# Patient Record
Sex: MALE | Race: WHITE
[De-identification: names, ages, dates, MRNs, and addresses within clinical notes are randomized per-mention and may not be internally consistent; named-entity substitution may affect disease eponyms.]

---

## 2018-07-03 ENCOUNTER — HOSPITAL ENCOUNTER (INPATIENT)
Dept: HOSPITAL 56 - MW.ED | Age: 58
LOS: 6 days | Discharge: HOME | DRG: 383 | End: 2018-07-09
Attending: INTERNAL MEDICINE | Admitting: INTERNAL MEDICINE
Payer: COMMERCIAL

## 2018-07-03 DIAGNOSIS — E11.9: ICD-10-CM

## 2018-07-03 DIAGNOSIS — Z79.899: ICD-10-CM

## 2018-07-03 DIAGNOSIS — I10: ICD-10-CM

## 2018-07-03 DIAGNOSIS — R74.0: ICD-10-CM

## 2018-07-03 DIAGNOSIS — Z79.82: ICD-10-CM

## 2018-07-03 DIAGNOSIS — L03.115: Primary | ICD-10-CM

## 2018-07-03 DIAGNOSIS — Z88.8: ICD-10-CM

## 2018-07-03 DIAGNOSIS — E66.01: ICD-10-CM

## 2018-07-03 DIAGNOSIS — K21.9: ICD-10-CM

## 2018-07-03 LAB
CHLORIDE SERPL-SCNC: 103 MMOL/L (ref 98–107)
SODIUM SERPL-SCNC: 135 MMOL/L (ref 136–148)

## 2018-07-03 RX ADMIN — DEXTROSE SCH UNITS: 10 SOLUTION INTRAVENOUS at 15:19

## 2018-07-03 NOTE — CR
EXAMINATION: Two-view chest (PA and Lateral views).

 

HISTORY: Lower extremity swelling.

 

FINDINGS: 

The trachea is midline. The cardiomediastinal silhouette is within normal limits. No pulmonary infilt
rates, effusions or pneumothorax.

 

Osseous structures appear unremarkable.

 

IMPRESSION: 

No acute cardiopulmonary process.

## 2018-07-03 NOTE — US
ULTRASOUND EXAMINATION OF  the right lower extremity WITH DOPPLER

 

HISTORY:  Swelling

 

FINDINGS: 

Examination of the right leg was performed from the groin to the calf region.  All visualized segment
s including common femoral, proximal greater saphenous, superficial femoral, popliteal and calf veins
 appear patent with good flow and augmentation.  There is no evidence of deep vein thrombosis.

 

IMPRESSION: 

No evidence of a DVT.

## 2018-07-03 NOTE — EDM.PDOC
ED HPI GENERAL MEDICAL PROBLEM





- General


Chief Complaint: Lower Extremity Injury/Pain


Stated Complaint: RIGHT LEG SWELLING


Time Seen by Provider: 07/03/18 10:57


Source of Information: Reports: Patient





- History of Present Illness


INITIAL COMMENTS - FREE TEXT/NARRATIVE: 





HISTORY AND PHYSICAL:





History of present illness:


[]Khanh is a 57-year-old male here for right lower extremity swelling. He 

reports that he was working last week, took his boot off and leg was swollen. 

The leg has progressively gotten more swelling and red in the last week. He 

states it is painful to walk on. He denies any injury or open wounds to the 

leg. He works as a , 3 weeks ago drove from [place] to here. 

Patient also complains of a cough but no SOB, chest pain/pressure, hemoptysis. 

He denies fevers but reports he has been waking up in cold sweats. 


Past medical history of diabetes, hypertension, hyperlipidemia, GERD.  Patient 

recently started on trulicity which he has been out of the past 2 weeks. A1c 2 

months ago was 7.7%. 





Review of systems: 


As per history of present illness and below otherwise all systems reviewed and 

negative.





Past medical history: 


As per history of present illness and as reviewed below otherwise 

noncontributory.





Surgical history: 


As per history of present illness and as reviewed below otherwise 

noncontributory.





Social history: 


No reported history of drug or alcohol abuse.





Family history: 


As per history of present illness and as reviewed below otherwise 

noncontributory.





Physical exam:


HEENT: Atraumatic, normocephalic, pupils reactive, negative for conjunctival 

pallor or scleral icterus, mucous membranes moist, throat clear, neck supple, 

nontender, trachea midline.


Lungs: Dry cough noted. Clear to auscultation, breath sounds equal bilaterally, 

chest nontender.


Heart: S1S2, regular, negative for clicks, rubs.


Pelvis: Stable nontender.


Genitourinary: Deferred.


Rectal: Deferred.


Extremities: right lower extremity is moderately swollen and erythematous from 

the ankle to just inferior to the knee and up the medial thigh. 4+ pitting 

edema right, 2+ pitting edema left.   Neurovascular unremarkable.


Neuro: Awake, alert, oriented. Cranial nerves II through XII unremarkable. 

Cerebellum unremarkable. Motor and sensory unremarkable throughout. Exam 

nonfocal.





Notes: Discussed with Dr. Rodas, patient accepted as inpatient. 





Diagnostics:


[CBC, PT/INR, CMP, UA


Venous doppler US, right


CXR]





Therapeutics:


[IV vancomycin 1 gram]





Impression: 


[Cellulitis, right lower extremity]





Plan:


[Patient admitted for IV antibiotics ]





Definitive disposition and diagnosis as appropriate pending reevaluation and 

review of above.





Onset: Gradual


Duration: Week(s): (1)


Location: Reports: Lower Extremity, Right


  ** Right leg


Pain Score (Numeric/FACES): 10





- Related Data


 Allergies











Allergy/AdvReac Type Severity Reaction Status Date / Time


 


prednisone Allergy  Nose Bleeds Verified 07/03/18 10:57











Home Meds: 


 Home Meds





Aspirin 325 mg PO BID 07/03/18 [History]


Glimepiride [Amaryl] 4 mg PO DAILY 07/03/18 [History]


Lisinopril 40 mg PO DAILY 07/03/18 [History]


Naproxen Sodium [Aleve] 220 mg PO ASDIRECTED PRN 07/03/18 [History]


Omeprazole 20 mg PO DAILY 07/03/18 [History]


Pravastatin [Pravachol] 40 mg PO DAILY 07/03/18 [History]


amLODIPine/Valsartan [Amlodipine-Valsartan  mg] 1 each PO DAILY 07/03/18 [

History]


metFORMIN HCl [Metformin HCl] 1,000 mg PO BID 07/03/18 [History]











Past Medical History


Cardiovascular History: Reports: Hypertension


Gastrointestinal History: Reports: GERD, Hiatal Hernia


Endocrine/Metabolic History: Reports: Diabetes, Type II, Obesity/BMI 30+





Social & Family History





- Tobacco Use


Smoking Status *Q: Never Smoker


Second Hand Smoke Exposure: Yes





- Caffeine Use


Caffeine Use: Reports: Coffee





- Recreational Drug Use


Recreational Drug Use: No





Review of Systems





- Review of Systems


Review Of Systems: ROS reveals no pertinent complaints other than HPI.





ED EXAM, GENERAL





- Physical Exam


Exam: See Below (see dictation)





Course





- Vital Signs


Last Recorded V/S: 


 Last Vital Signs











Temp  36.4 C   07/03/18 11:00


 


Pulse  109 H  07/03/18 11:00


 


Resp  16   07/03/18 11:00


 


BP  130/75   07/03/18 11:00


 


Pulse Ox  97   07/03/18 11:00














- Orders/Labs/Meds


Orders: 


 Active Orders 24 hr











 Category Date Time Status


 


 Admission Status [Patient Status] [ADT] Routine ADT  07/03/18 12:58 Active


 


 CULTURE BLOOD [BC] Stat Lab  07/03/18 12:38 Ordered


 


 CULTURE BLOOD [BC] Stat Lab  07/03/18 12:38 Ordered


 


 UA W/MICROSCOPIC [URIN] Stat Lab  07/03/18 12:27 Received


 


 Sodium Chloride 0.9% [Saline Flush] Med  07/03/18 11:25 Active





 10 ml FLUSH ASDIRECTED PRN   


 


 Sodium Chloride 0.9% [Saline Flush] Med  07/03/18 11:25 Active





 2.5 ml FLUSH ASDIRECTED PRN   


 


 Vancomycin [Vancocin] 1 gm Med  07/03/18 12:43 Active





 Sodium Chloride 0.9% [Normal Saline] 250 ml   





 IV ONETIME   


 


 Blood Culture x2 Reflex Set [OM.PC] Stat Oth  07/03/18 12:38 Ordered


 


 Saline Lock Insert [OM.PC] Stat Oth  07/03/18 11:25 Ordered








 Medication Orders





Vancomycin HCl 1 gm/ Sodium (Chloride)  250 mls @ 250 mls/hr IV ONETIME ONE


   Stop: 07/03/18 13:42


   Last Admin: 07/03/18 12:53 Dose:  250 mls/hr


Sodium Chloride (Saline Flush)  10 ml FLUSH ASDIRECTED PRN


   PRN Reason: Keep Vein Open


Sodium Chloride (Saline Flush)  2.5 ml FLUSH ASDIRECTED PRN


   PRN Reason: Keep Vein Open








Labs: 


 Laboratory Tests











  07/03/18 07/03/18 07/03/18 Range/Units





  11:35 11:35 11:35 


 


WBC  13.99 H    (4.0-11.0)  K/uL


 


RBC  4.56    (4.50-5.90)  M/uL


 


Hgb  13.6    (13.0-17.0)  g/dL


 


Hct  38.4    (38.0-50.0)  %


 


MCV  84.2    (80.0-98.0)  fL


 


MCH  29.8    (27.0-32.0)  pg


 


MCHC  35.4    (31.0-37.0)  g/dL


 


RDW Std Deviation  45.4    (28.0-62.0)  fl


 


RDW Coeff of Bozena  15    (11.0-15.0)  %


 


Plt Count  148 L    (150-400)  K/uL


 


MPV  12.60 H    (7.40-12.00)  fL


 


Add Manual Diff  YES    


 


Neutrophils % (Manual)  83 H    (48.0-80.0)  %


 


Band Neutrophils %  5    %


 


Lymphocytes % (Manual)  6 L    (16.0-40.0)  %


 


Monocytes % (Manual)  5    (0.0-15.0)  %


 


Eosinophils % (Manual)  1    (0.0-7.0)  %


 


Nucleated RBC %  0.0    /100WBC


 


Absolute Seg Neuts  11.6 H    (1.4-5.7)  


 


Band Neutrophils #  0.7    


 


Lymphocytes # (Manual)  0.8    (0.6-2.4)  


 


Monocytes # (Manual)  0.7    (0.0-0.8)  


 


Eosinophils # (Manual)  0.1    (0.0-0.7)  


 


Nucleated RBCs #  0    K/uL


 


INR   1.09   


 


Sodium    135 L  (136-148)  mmol/L


 


Potassium    3.5  (3.5-5.1)  mmol/L


 


Chloride    103  ()  mmol/L


 


Carbon Dioxide    25.4  (21.0-32.0)  mmol/L


 


BUN    20 H  (7.0-18.0)  mg/dL


 


Creatinine    1.3  (0.8-1.3)  mg/dL


 


Est Cr Clr Drug Dosing    60.03  mL/min


 


Estimated GFR (MDRD)    57.3  ml/min


 


Glucose    406 H  ()  mg/dL


 


Calcium    8.7  (8.5-10.1)  mg/dL


 


Total Bilirubin    1.4 H  (0.2-1.0)  mg/dL


 


AST    61 H  (15-37)  IU/L


 


ALT    97 H  (14-63)  IU/L


 


Alkaline Phosphatase    159 H  ()  U/L


 


Total Protein    6.4  (6.4-8.2)  g/dL


 


Albumin    2.2 L  (3.4-5.0)  g/dL


 


Globulin    4.2 H  (2.0-3.5)  g/dL


 


Albumin/Globulin Ratio    0.5 L  (1.3-2.8)  


 


Urine Color     


 


Urine Appearance     


 


Urine pH     (5.0-8.0)  


 


Ur Specific Gravity     (1.001-1.035)  


 


Urine Protein     (NEGATIVE)  mg/dL


 


Urine Glucose (UA)     (NEGATIVE)  mg/dL


 


Urine Ketones     (NEGATIVE)  mg/dL


 


Urine Occult Blood     (NEGATIVE)  


 


Urine Nitrite     (NEGATIVE)  


 


Urine Bilirubin     (NEGATIVE)  


 


Urine Ictotest     


 


Urine Urobilinogen     (<2.0)  EU/dL


 


Ur Leukocyte Esterase     (NEGATIVE)  


 


Urine RBC     (0-2/HPF)  


 


Urine WBC     (0-5/HPF)  


 


Ur Epithelial Cells     (NONE-FEW)  


 


Urine Bacteria     (NEGATIVE)  














  07/03/18 Range/Units





  12:27 


 


WBC   (4.0-11.0)  K/uL


 


RBC   (4.50-5.90)  M/uL


 


Hgb   (13.0-17.0)  g/dL


 


Hct   (38.0-50.0)  %


 


MCV   (80.0-98.0)  fL


 


MCH   (27.0-32.0)  pg


 


MCHC   (31.0-37.0)  g/dL


 


RDW Std Deviation   (28.0-62.0)  fl


 


RDW Coeff of Bozena   (11.0-15.0)  %


 


Plt Count   (150-400)  K/uL


 


MPV   (7.40-12.00)  fL


 


Add Manual Diff   


 


Neutrophils % (Manual)   (48.0-80.0)  %


 


Band Neutrophils %   %


 


Lymphocytes % (Manual)   (16.0-40.0)  %


 


Monocytes % (Manual)   (0.0-15.0)  %


 


Eosinophils % (Manual)   (0.0-7.0)  %


 


Nucleated RBC %   /100WBC


 


Absolute Seg Neuts   (1.4-5.7)  


 


Band Neutrophils #   


 


Lymphocytes # (Manual)   (0.6-2.4)  


 


Monocytes # (Manual)   (0.0-0.8)  


 


Eosinophils # (Manual)   (0.0-0.7)  


 


Nucleated RBCs #   K/uL


 


INR   


 


Sodium   (136-148)  mmol/L


 


Potassium   (3.5-5.1)  mmol/L


 


Chloride   ()  mmol/L


 


Carbon Dioxide   (21.0-32.0)  mmol/L


 


BUN   (7.0-18.0)  mg/dL


 


Creatinine   (0.8-1.3)  mg/dL


 


Est Cr Clr Drug Dosing   mL/min


 


Estimated GFR (MDRD)   ml/min


 


Glucose   ()  mg/dL


 


Calcium   (8.5-10.1)  mg/dL


 


Total Bilirubin   (0.2-1.0)  mg/dL


 


AST   (15-37)  IU/L


 


ALT   (14-63)  IU/L


 


Alkaline Phosphatase   ()  U/L


 


Total Protein   (6.4-8.2)  g/dL


 


Albumin   (3.4-5.0)  g/dL


 


Globulin   (2.0-3.5)  g/dL


 


Albumin/Globulin Ratio   (1.3-2.8)  


 


Urine Color  YELLOW  


 


Urine Appearance  CLEAR  


 


Urine pH  6.0  (5.0-8.0)  


 


Ur Specific Gravity  <= 1.005  (1.001-1.035)  


 


Urine Protein  30  (NEGATIVE)  mg/dL


 


Urine Glucose (UA)  >=1000  (NEGATIVE)  mg/dL


 


Urine Ketones  TRACE H  (NEGATIVE)  mg/dL


 


Urine Occult Blood  TRACE-LYSED  (NEGATIVE)  


 


Urine Nitrite  NEGATIVE  (NEGATIVE)  


 


Urine Bilirubin  SMALL H  (NEGATIVE)  


 


Urine Ictotest  NEGATIVE  


 


Urine Urobilinogen  >=8.0 H  (<2.0)  EU/dL


 


Ur Leukocyte Esterase  NEGATIVE  (NEGATIVE)  


 


Urine RBC  0-1  (0-2/HPF)  


 


Urine WBC  0-2  (0-5/HPF)  


 


Ur Epithelial Cells  RARE  (NONE-FEW)  


 


Urine Bacteria  RARE  (NEGATIVE)  











Meds: 


Medications











Generic Name Dose Route Start Last Admin





  Trade Name Freq  PRN Reason Stop Dose Admin


 


Vancomycin HCl 1 gm/ Sodium  250 mls @ 250 mls/hr  07/03/18 12:43  07/03/18 12:

53





  Chloride  IV  07/03/18 13:42  250 mls/hr





  ONETIME ONE   Administration





     





     





     





     


 


Sodium Chloride  10 ml  07/03/18 11:25  





  Saline Flush  FLUSH   





  ASDIRECTED PRN   





  Keep Vein Open   





     





     





     


 


Sodium Chloride  2.5 ml  07/03/18 11:25  





  Saline Flush  FLUSH   





  ASDIRECTED PRN   





  Keep Vein Open   





     





     





     














Departure





- Departure


Time of Disposition: 13:09


Disposition: Admitted As Inpatient 68


Condition: Good


Clinical Impression: 


Cellulitis


Qualifiers:


 Site of cellulitis: extremity Site of cellulitis of extremity: lower extremity 

Laterality: right Qualified Code(s): L03.115 - Cellulitis of right lower limb








- Discharge Information


Referrals: 


PCP,None [Primary Care Provider] - 


Forms:  ED Department Discharge





- My Orders


Last 24 Hours: 


My Active Orders





07/03/18 11:25


Sodium Chloride 0.9% [Saline Flush]   10 ml FLUSH ASDIRECTED PRN 


Sodium Chloride 0.9% [Saline Flush]   2.5 ml FLUSH ASDIRECTED PRN 


Saline Lock Insert [OM.PC] Stat 





07/03/18 12:27


UA W/MICROSCOPIC [URIN] Stat 





07/03/18 12:38


CULTURE BLOOD [BC] Stat 


CULTURE BLOOD [BC] Stat 


Blood Culture x2 Reflex Set [OM.PC] Stat 





07/03/18 12:43


Vancomycin [Vancocin] 1 gm   Sodium Chloride 0.9% [Normal Saline] 250 ml IV 

ONETIME 





07/03/18 12:58


Admission Status [Patient Status] [ADT] Routine 














- Assessment/Plan


Last 24 Hours: 


My Active Orders





07/03/18 11:25


Sodium Chloride 0.9% [Saline Flush]   10 ml FLUSH ASDIRECTED PRN 


Sodium Chloride 0.9% [Saline Flush]   2.5 ml FLUSH ASDIRECTED PRN 


Saline Lock Insert [OM.PC] Stat 





07/03/18 12:27


UA W/MICROSCOPIC [URIN] Stat 





07/03/18 12:38


CULTURE BLOOD [BC] Stat 


CULTURE BLOOD [BC] Stat 


Blood Culture x2 Reflex Set [OM.PC] Stat 





07/03/18 12:43


Vancomycin [Vancocin] 1 gm   Sodium Chloride 0.9% [Normal Saline] 250 ml IV 

ONETIME 





07/03/18 12:58


Admission Status [Patient Status] [ADT] Routine

## 2018-07-03 NOTE — PCM.HP
H&P History of Present Illness





- General


Date of Service: 07/03/18


Admit Problem/Dx: 


 Admission Diagnosis/Problem





Admission Diagnosis/Problem      Cellulitis








Source of Information: Patient


History Limitations: Reports: No Limitations





- History of Present Illness


Initial Comments - Free Text/Narative: 





This 57 year old male with pmh of DM type 2, HTN, and obesity presented to the 

ED today with complaints of a red, warm swollen, tender R lower leg that has 

been oging on for nearly 1 week. He reports last week he drove with his 

daughter and grandson from [place]. He reports working in the heat and not 

drinking many fluids last week Thursday and at the end of the day he noticed 

his R leg was swollen and red. He kept it elevated and put ice packs on it and 

he was able to get his boot on the next day. He continued working over the 

weekend and did this routine. Today He woke up and noticed the leg was quite a 

bit more swollen and the redness spread up to his upper inner thigh and he 

noticeds his foot was more swollen than it had been. He reports having 

subjective fevers and chills at home with general malaise and fatigue. No chest 

pain or SOB. He reports a dry hacking cough with some feelings of post nasal 

drip that has been going on for about 1 week. No sinus congestion sore throat 

or ear pain. No Neck pain, abodminal pain or urinary symptoms. he reports 

feeling very thirsty and voiding frequently but the amount seems off for how 

much he is drinking, he reports not urinating much. No dysuria. No diarrhea or 

constipation and no black or bloody BMs. He has good follow up at home. Recent 

A1c was 7.7. He was started on Trulicity recently but has not taken it for 2 

weeks because he was out here working and he didn't bring it because it has to 

be refrigerated. He denies alcohol use, recreational drug use or tobacco abuse. 

He denies known history of CHF, no CAD or MI. No COPD.





In the ED leukocytosis noted at 13,990, Na 135, BUN 20, Cr 1.3. Glucose 406. UA 

negative. Bili 1.6 AST 61, ALT 97, Alk phos 159. He initially was noted to be 

slightly tachycardic, BP WNL and no hypoxia. Venous doppler obtained of R lower 

leg, which was negative for DVT and CXR negative. He was treated with 

Vancomycin and admitted inpatient for R lower leg cellulitis. 


  ** Right leg


Pain Score (Numeric/FACES): 10





- Related Data


Allergies/Adverse Reactions: 


 Allergies











Allergy/AdvReac Type Severity Reaction Status Date / Time


 


prednisone Allergy  Nose Bleeds Verified 07/03/18 10:57











Home Medications: 


 Home Meds





Aspirin 325 mg PO BID 07/03/18 [History]


Glimepiride [Amaryl] 4 mg PO DAILY 07/03/18 [History]


Lisinopril 40 mg PO DAILY 07/03/18 [History]


Naproxen Sodium [Aleve] 220 mg PO ASDIRECTED PRN 07/03/18 [History]


Omeprazole 20 mg PO DAILY 07/03/18 [History]


Pravastatin [Pravachol] 40 mg PO DAILY 07/03/18 [History]


amLODIPine/Valsartan [Amlodipine-Valsartan  mg] 1 each PO DAILY 07/03/18 [

History]


metFORMIN HCl [Metformin HCl] 1,000 mg PO BID 07/03/18 [History]











Past Medical History


Cardiovascular History: Reports: Hypertension.  Denies: Afib, Blood Clots/VTE/

DVT, CAD, Heart Failure, MI


Respiratory History: Reports: None.  Denies: Asthma, COPD, PE


Gastrointestinal History: Reports: GERD, Hiatal Hernia


Genitourinary History: Reports: None.  Denies: Chronic Renal Insuffiency


Musculoskeletal History: Reports: None


Neurological History: Reports: None.  Denies: CVA, TIA


Endocrine/Metabolic History: Reports: Diabetes, Type II, Obesity/BMI 30+.  

Denies: Hypothyroidism





- Past Surgical History


Cardiovascular Surgical History: Reports: None


Respiratory Surgical History: Reports: None


GI Surgical History: Reports: None


Endocrine Surgical History: Reports: None





Social & Family History





- Tobacco Use


Smoking Status *Q: Never Smoker


Second Hand Smoke Exposure: Yes





- Caffeine Use


Caffeine Use: Reports: Coffee





- Alcohol Use


Alcohol Use Frequency: Rarely





- Recreational Drug Use


Recreational Drug Use: No





- Living Situation & Occupation


Living situation: Reports: 


Occupation: Employed





H&P Review of Systems





- Review of Systems:


Review Of Systems: See Below


General: Reports: Fever, Chills, Malaise, Fatigue


HEENT: Reports: Post Nasal Drip.  Denies: Ear Pain, Headaches, Sinus Congestion

, Sore Throat, Vertigo


Pulmonary: Reports: Cough (dry hacking).  Denies: Shortness of Breath, Wheezing

, Pleuritic Chest Pain


Cardiovascular: Reports: Edema (R lower leg).  Denies: Chest Pain, Palpitations

, Lightheadedness, Syncope


Gastrointestinal: Reports: No Symptoms.  Denies: Abdominal Pain, Black Stool, 

Bloody Stool, Decreased Appetite, Distension, Nausea, Vomiting


Genitourinary: Reports: No Symptoms.  Denies: Dysuria, Frequency, Burning, Pain


Musculoskeletal: Reports: Leg Pain (R lower leg)


Skin: Reports: Erythema.  Denies: Wound


Neurological: Reports: No Symptoms


Hematologic/Lymphatic: Reports: No Symptoms


Immunologic: Reports: No Symptoms





Exam





- Exam


Exam: See Below





- Vital Signs


Vital Signs: 


 Last Vital Signs











Temp  97.0 F   07/03/18 13:33


 


Pulse  98   07/03/18 13:33


 


Resp  16   07/03/18 13:33


 


BP  131/74   07/03/18 13:33


 


Pulse Ox  97   07/03/18 13:33











Weight: 153.6 kg





- Exam


Quality Assessment: DVT Prophylaxis


General: Alert, Oriented, Cooperative


HEENT: Conjunctiva Clear, Mucosa Moist & Pink, Posterior Pharynx Clear, Pupils 

Reactive


Neck: Supple, Trachea Midline


Lungs: Clear to Auscultation, Normal Respiratory Effort


Cardiovascular: Regular Rate, Regular Rhythm, Normal S1, Normal S2.  No: 

Systolic Murmur


GI/Abdominal Exam: Normal Bowel Sounds, Soft, Non-Tender


 (Male) Exam: Normal Inspection.  No: Scrotal Swelling


Back Exam: Normal Inspection


Extremities: Normal Inspection, Normal Range of Motion, Pedal Edema (+2 pitting 

edema to R lower leg, +1 pitting to L-patient reports this is normal for L. But 

R is more swollen.)


Skin: Other (Erythema to entire R lower leg with small scattered blisters to 

anterior shin. No open areas draining. Leg very taught, warm and edematous. No 

erythema to foot or toes and does not include knee, but extends medially upper 

inner thigh with some tenderness to R groin and lymphadenopathy noted in groin. 

Some chronic venous skin changes noted bilaterally to lower legs.)


Neuro Extensive - Mental Status: Alert, Oriented x3


Neuro Extensive - Motor, Sensory, Reflexes: CN II-XII Intact


Psychiatric: Alert, Normal Affect, Normal Mood





- Patient Data


Lab Results Last 24 hrs: 


 Laboratory Results - last 24 hr











  07/03/18 07/03/18 07/03/18 Range/Units





  11:35 11:35 11:35 


 


WBC  13.99 H    (4.0-11.0)  K/uL


 


RBC  4.56    (4.50-5.90)  M/uL


 


Hgb  13.6    (13.0-17.0)  g/dL


 


Hct  38.4    (38.0-50.0)  %


 


MCV  84.2    (80.0-98.0)  fL


 


MCH  29.8    (27.0-32.0)  pg


 


MCHC  35.4    (31.0-37.0)  g/dL


 


RDW Std Deviation  45.4    (28.0-62.0)  fl


 


RDW Coeff of Bozena  15    (11.0-15.0)  %


 


Plt Count  148 L    (150-400)  K/uL


 


MPV  12.60 H    (7.40-12.00)  fL


 


Add Manual Diff  YES    


 


Neutrophils % (Manual)  83 H    (48.0-80.0)  %


 


Band Neutrophils %  5    %


 


Lymphocytes % (Manual)  6 L    (16.0-40.0)  %


 


Monocytes % (Manual)  5    (0.0-15.0)  %


 


Eosinophils % (Manual)  1    (0.0-7.0)  %


 


Nucleated RBC %  0.0    /100WBC


 


Absolute Seg Neuts  11.6 H    (1.4-5.7)  


 


Band Neutrophils #  0.7    


 


Lymphocytes # (Manual)  0.8    (0.6-2.4)  


 


Monocytes # (Manual)  0.7    (0.0-0.8)  


 


Eosinophils # (Manual)  0.1    (0.0-0.7)  


 


Nucleated RBCs #  0    K/uL


 


INR   1.09   


 


Sodium    135 L  (136-148)  mmol/L


 


Potassium    3.5  (3.5-5.1)  mmol/L


 


Chloride    103  ()  mmol/L


 


Carbon Dioxide    25.4  (21.0-32.0)  mmol/L


 


BUN    20 H  (7.0-18.0)  mg/dL


 


Creatinine    1.3  (0.8-1.3)  mg/dL


 


Est Cr Clr Drug Dosing    60.03  mL/min


 


Estimated GFR (MDRD)    57.3  ml/min


 


Glucose    406 H  ()  mg/dL


 


Calcium    8.7  (8.5-10.1)  mg/dL


 


Total Bilirubin    1.4 H  (0.2-1.0)  mg/dL


 


AST    61 H  (15-37)  IU/L


 


ALT    97 H  (14-63)  IU/L


 


Alkaline Phosphatase    159 H  ()  U/L


 


Total Protein    6.4  (6.4-8.2)  g/dL


 


Albumin    2.2 L  (3.4-5.0)  g/dL


 


Globulin    4.2 H  (2.0-3.5)  g/dL


 


Albumin/Globulin Ratio    0.5 L  (1.3-2.8)  


 


Urine Color     


 


Urine Appearance     


 


Urine pH     (5.0-8.0)  


 


Ur Specific Gravity     (1.001-1.035)  


 


Urine Protein     (NEGATIVE)  mg/dL


 


Urine Glucose (UA)     (NEGATIVE)  mg/dL


 


Urine Ketones     (NEGATIVE)  mg/dL


 


Urine Occult Blood     (NEGATIVE)  


 


Urine Nitrite     (NEGATIVE)  


 


Urine Bilirubin     (NEGATIVE)  


 


Urine Ictotest     


 


Urine Urobilinogen     (<2.0)  EU/dL


 


Ur Leukocyte Esterase     (NEGATIVE)  


 


Urine RBC     (0-2/HPF)  


 


Urine WBC     (0-5/HPF)  


 


Ur Epithelial Cells     (NONE-FEW)  


 


Urine Bacteria     (NEGATIVE)  














  07/03/18 Range/Units





  12:27 


 


WBC   (4.0-11.0)  K/uL


 


RBC   (4.50-5.90)  M/uL


 


Hgb   (13.0-17.0)  g/dL


 


Hct   (38.0-50.0)  %


 


MCV   (80.0-98.0)  fL


 


MCH   (27.0-32.0)  pg


 


MCHC   (31.0-37.0)  g/dL


 


RDW Std Deviation   (28.0-62.0)  fl


 


RDW Coeff of Bozena   (11.0-15.0)  %


 


Plt Count   (150-400)  K/uL


 


MPV   (7.40-12.00)  fL


 


Add Manual Diff   


 


Neutrophils % (Manual)   (48.0-80.0)  %


 


Band Neutrophils %   %


 


Lymphocytes % (Manual)   (16.0-40.0)  %


 


Monocytes % (Manual)   (0.0-15.0)  %


 


Eosinophils % (Manual)   (0.0-7.0)  %


 


Nucleated RBC %   /100WBC


 


Absolute Seg Neuts   (1.4-5.7)  


 


Band Neutrophils #   


 


Lymphocytes # (Manual)   (0.6-2.4)  


 


Monocytes # (Manual)   (0.0-0.8)  


 


Eosinophils # (Manual)   (0.0-0.7)  


 


Nucleated RBCs #   K/uL


 


INR   


 


Sodium   (136-148)  mmol/L


 


Potassium   (3.5-5.1)  mmol/L


 


Chloride   ()  mmol/L


 


Carbon Dioxide   (21.0-32.0)  mmol/L


 


BUN   (7.0-18.0)  mg/dL


 


Creatinine   (0.8-1.3)  mg/dL


 


Est Cr Clr Drug Dosing   mL/min


 


Estimated GFR (MDRD)   ml/min


 


Glucose   ()  mg/dL


 


Calcium   (8.5-10.1)  mg/dL


 


Total Bilirubin   (0.2-1.0)  mg/dL


 


AST   (15-37)  IU/L


 


ALT   (14-63)  IU/L


 


Alkaline Phosphatase   ()  U/L


 


Total Protein   (6.4-8.2)  g/dL


 


Albumin   (3.4-5.0)  g/dL


 


Globulin   (2.0-3.5)  g/dL


 


Albumin/Globulin Ratio   (1.3-2.8)  


 


Urine Color  YELLOW  


 


Urine Appearance  CLEAR  


 


Urine pH  6.0  (5.0-8.0)  


 


Ur Specific Gravity  <= 1.005  (1.001-1.035)  


 


Urine Protein  30  (NEGATIVE)  mg/dL


 


Urine Glucose (UA)  >=1000  (NEGATIVE)  mg/dL


 


Urine Ketones  TRACE H  (NEGATIVE)  mg/dL


 


Urine Occult Blood  TRACE-LYSED  (NEGATIVE)  


 


Urine Nitrite  NEGATIVE  (NEGATIVE)  


 


Urine Bilirubin  SMALL H  (NEGATIVE)  


 


Urine Ictotest  NEGATIVE  


 


Urine Urobilinogen  >=8.0 H  (<2.0)  EU/dL


 


Ur Leukocyte Esterase  NEGATIVE  (NEGATIVE)  


 


Urine RBC  0-1  (0-2/HPF)  


 


Urine WBC  0-2  (0-5/HPF)  


 


Ur Epithelial Cells  RARE  (NONE-FEW)  


 


Urine Bacteria  RARE  (NEGATIVE)  











Result Diagrams: 


 07/03/18 11:35





 07/03/18 11:35





*Q Meaningful Use (ADM)





- VTE Risk Assess *Q


Each Risk Factor Represents 1 Point: Age 41 - 59 years, Obesity ( BMI > 25 kg/m2

)


Total Score 1 Point Risk Factors: 2


Each Risk Factor Represents 2 Points: None


Total Score 2 Point Risk Factors: 0


Each Risk Factor Represents 3 Points: None


Total Score 3 Point Risk Factors: 0


Each Risk Factor Represents 5 Points: None


Total Score 5 Point Risk Factors: 0


Venous Thromboembolism Risk Factor Score *Q: 2





- Problem List


(1) Cellulitis


SNOMED Code(s): 469299579


   ICD Code: L03.90 - CELLULITIS, UNSPECIFIED   Status: Acute   Current Visit: 

Yes   


Qualifiers: 


   Site of cellulitis: extremity   Site of cellulitis of extremity: lower 

extremity   Laterality: right   Qualified Code(s): L03.115 - Cellulitis of 

right lower limb   





(2) HTN (hypertension)


SNOMED Code(s): 74541352


   ICD Code: I10 - ESSENTIAL (PRIMARY) HYPERTENSION   Status: Chronic   Current 

Visit: Yes   


Qualifiers: 


   Hypertension type: essential hypertension   Qualified Code(s): I10 - 

Essential (primary) hypertension   





(3) Morbid obesity


SNOMED Code(s): 080079383


   ICD Code: E66.01 - MORBID (SEVERE) OBESITY DUE TO EXCESS CALORIES   Status: 

Chronic   Current Visit: Yes   





(4) DM type 2 (diabetes mellitus, type 2)


SNOMED Code(s): 90789675


   ICD Code: E11.9 - TYPE 2 DIABETES MELLITUS WITHOUT COMPLICATIONS   Status: 

Chronic   Current Visit: Yes   


Qualifiers: 


   Diabetes mellitus long term insulin use: without long term use   Diabetes 

mellitus complication status: without complication   Qualified Code(s): E11.9 - 

Type 2 diabetes mellitus without complications   





(5) GERD (gastroesophageal reflux disease)


SNOMED Code(s): 979331619


   ICD Code: K21.9 - GASTRO-ESOPHAGEAL REFLUX DISEASE WITHOUT ESOPHAGITIS   

Status: Chronic   Current Visit: Yes   


Qualifiers: 


   Esophagitis presence: esophagitis presence not specified   Qualified Code(s)

: K21.9 - Gastro-esophageal reflux disease without esophagitis   


Problem List Initiated/Reviewed/Updated: Yes


Orders Last 24hrs: 


 Active Orders 24 hr











 Category Date Time Status


 


 Admission Status [Patient Status] [ADT] Routine ADT  07/03/18 12:58 Active


 


 Blood Glucose Check, Bedside [] TIDAC Care  07/03/18 14:32 Ordered


 


 Elevate Extremity [RC] BID Care  07/03/18 14:31 Ordered


 


 Intake and Output [RC] QSHIFT Care  07/03/18 14:29 Ordered


 


 May Shower [RC] ASDIRECTED Care  07/03/18 14:28 Ordered


 


 Oxygen Therapy [RC] PRN Care  07/03/18 14:28 Ordered


 


 Up ad Luana [] ASDIRECTED Care  07/03/18 14:28 Ordered


 


 VTE/DVT Education [RC] PER UNIT ROUTINE Care  07/03/18 14:28 Ordered


 


 Vital Signs [RC] Q4H Care  07/03/18 14:28 Ordered


 


 [employer] Diet [DIET] Diet  07/03/18 Dinner Ordered


 


 CBC WITH AUTO DIFF [HEME] AM Lab  07/04/18 05:11 Ordered


 


 CBC WITH AUTO DIFF [HEME] AM Lab  07/05/18 05:11 Ordered


 


 COMPREHENSIVE METABOLIC PN,CMP [CHEM] AM Lab  07/04/18 05:11 Ordered


 


 CULTURE BLOOD [BC] Stat Lab  07/03/18 11:35 Received


 


 CULTURE BLOOD [BC] Stat Lab  07/03/18 12:53 Received


 


 GLYCOSYLATED HEMOGLOBIN,HGBA1C [CHEM] Routine Lab  07/03/18 14:34 Ordered


 


 UA W/MICROSCOPIC [URIN] Stat Lab  07/03/18 12:27 Ordered


 


 Acetaminophen [Tylenol] Med  07/03/18 14:28 Ordered





 650 mg PO Q4H PRN   


 


 Heparin Sodium Med  07/03/18 14:30 Ordered





 5,000 units SUBCUT Q12H   


 


 Insulin Aspart [NovoLOG] Med  07/03/18 17:00 Ordered





 See Protocol  SUBCUT TIDAC   


 


 Loratadine [Claritin] Med  07/03/18 14:45 Ordered





 10 mg PO DAILY   


 


 Ondansetron [Zofran] Med  07/03/18 14:28 Ordered





 4 mg IVPUSH Q4H PRN   


 


 Sodium Chloride 0.65% [Ocean Nasal Spray] Med  07/03/18 14:45 Ordered





 See Dose Instructions  AMAN DAILY   


 


 Sodium Chloride 0.9% @ 125 MLS/HR (1000ml) Med  07/03/18 14:30 Ordered





 Sodium Chloride 0.9% [Normal Saline] 1,000 ml   





 IV ASDIRECTED   


 


 Sodium Chloride 0.9% [Saline Flush] Med  07/03/18 11:25 Active





 10 ml FLUSH ASDIRECTED PRN   


 


 Sodium Chloride 0.9% [Saline Flush] Med  07/03/18 11:25 Active





 2.5 ml FLUSH ASDIRECTED PRN   


 


 Vancomycin Pharmacy to Dose [Pharmacy to Dose - Med  07/03/18 14:30 Ordered





 Vancomycin]   





 1 dose .XX ASDIRECTED   


 


 Blood Culture x2 Reflex Set [OM.PC] Stat Oth  07/03/18 12:38 Ordered


 


 Saline Lock Insert [OM.PC] Stat Oth  07/03/18 11:25 Ordered


 


 Resuscitation Status Routine Resus Stat  07/03/18 14:28 Ordered








 Medication Orders





Acetaminophen (Tylenol)  650 mg PO Q4H PRN


   PRN Reason: Pain (mild 1-3)


Heparin Sodium (Porcine) (Heparin Sodium)  5,000 units SUBCUT Q12H JEREMY


Sodium Chloride (Normal Saline)  1,000 mls @ 125 mls/hr IV ASDIRECTED JEREMY


Insulin Aspart (Novolog)  0 unit SUBCUT TIDAC JEREMY; Protocol


Ondansetron HCl (Zofran)  4 mg IVPUSH Q4H PRN


   PRN Reason: Nausea


Sodium Chloride (Saline Flush)  10 ml FLUSH ASDIRECTED PRN


   PRN Reason: Keep Vein Open


Sodium Chloride (Saline Flush)  2.5 ml FLUSH ASDIRECTED PRN


   PRN Reason: Keep Vein Open


Sodium Chloride (Ocean Nasal [place])  0 ml AMAN DAILY JEREMY


Vancomycin HCl (Pharmacy To Dose - Vancomycin)  1 dose .XX ASDIRECTED JEREMY








Assessment/Plan Comment:: 





This 57 year old male admitted with R ower leg cellulitis





1. R lower leg cellulitis: Will continue Vancomycin for now and monitor for 

improvement. Keep leg elevated as much as possible. No suspected abscess or 

draining areas. MOnitor labwork in am. Conitnue IVFs tonight, possible some 

dehydration.





2. HTN: Monitor. Currently stable 130/80s. report BP normally runs 150-160/80s. 





3. Dm type 2: Hyperglycemia noted in ED, 406. Reports he hasn't really check BS 

recently, But last A1c 1 month ago per patient report was 7.7. He reports never 

being above 8.5. Will check A1c just to be sure. Hold oral medications. Novolog 

SSI and monitor BS TIDAC. 





VTE prophylaxis: Heparin





Dispo: 2-3 days pending improvement.

## 2018-07-04 LAB
CHLORIDE SERPL-SCNC: 108 MMOL/L (ref 98–107)
SODIUM SERPL-SCNC: 142 MMOL/L (ref 136–148)

## 2018-07-04 RX ADMIN — DEXTROSE SCH UNITS: 10 SOLUTION INTRAVENOUS at 13:49

## 2018-07-04 RX ADMIN — DEXTROSE SCH UNITS: 10 SOLUTION INTRAVENOUS at 03:29

## 2018-07-04 RX ADMIN — OMEPRAZOLE SCH MG: 20 CAPSULE, DELAYED RELEASE ORAL at 07:10

## 2018-07-04 NOTE — PCM.PN
- General Info


Date of Service: 07/04/18


Admission Dx/Problem (Free Text): 


 Admission Diagnosis/Problem





Admission Diagnosis/Problem      Cellulitis








Subjective Update: 





Sitting up in chair eating breakfast. Reports leg hurts still but feels it is 

less swollen and tight. No fevers overnight. No chest pain or SOB. Hacking 

cough is annoying to him. No abdominal pain. 


Functional Status: Reports: Pain Controlled, Tolerating Diet, Ambulating, 

Urinating





- Review of Systems


General: Reports: No Symptoms.  Denies: Fever, Weakness, Malaise


HEENT: Reports: Sinus Congestion (PND).  Denies: Headaches, Sore Throat


Pulmonary: Reports: Cough (dry hacking).  Denies: Shortness of Breath, Sputum, 

Wheezing


Cardiovascular: Reports: No Symptoms.  Denies: Chest Pain, Palpitations, 

Orthopnea


Gastrointestinal: Reports: No Symptoms.  Denies: Abdominal Pain, Decreased 

Appetite, Nausea, Vomiting


Genitourinary: Reports: No Symptoms


Musculoskeletal: Reports: Leg Pain (R leg pain, improving slightly)


Skin: Reports: Other (redness the same, but swelling and pain slight 

improvement. to R lower leg)


Neurological: Reports: No Symptoms


Psychiatric: Reports: No Symptoms





- Patient Data


Vitals - Most Recent: 


 Last Vital Signs











Temp  98.6 F   07/04/18 04:07


 


Pulse  96   07/04/18 04:07


 


Resp  18   07/04/18 04:07


 


BP  166/81 H  07/04/18 04:07


 


Pulse Ox  98   07/04/18 04:07











Weight - Most Recent: 153.6 kg


Lab Results Last 24 Hours: 


 Laboratory Results - last 24 hr











  07/03/18 07/03/18 07/03/18 Range/Units





  11:35 11:35 11:35 


 


WBC  13.99 H    (4.0-11.0)  K/uL


 


RBC  4.56    (4.50-5.90)  M/uL


 


Hgb  13.6    (13.0-17.0)  g/dL


 


Hct  38.4    (38.0-50.0)  %


 


MCV  84.2    (80.0-98.0)  fL


 


MCH  29.8    (27.0-32.0)  pg


 


MCHC  35.4    (31.0-37.0)  g/dL


 


RDW Std Deviation  45.4    (28.0-62.0)  fl


 


RDW Coeff of Bozena  15    (11.0-15.0)  %


 


Plt Count  148 L    (150-400)  K/uL


 


MPV  12.60 H    (7.40-12.00)  fL


 


Neut % (Auto)     (48.0-80.0)  %


 


Lymph % (Auto)     (16.0-40.0)  %


 


Mono % (Auto)     (0.0-15.0)  %


 


Eos % (Auto)     (0.0-7.0)  %


 


Baso % (Auto)     (0.0-1.5)  %


 


Neut # (Auto)     (1.4-5.7)  K/uL


 


Lymph # (Auto)     (0.6-2.4)  K/uL


 


Mono # (Auto)     (0.0-0.8)  K/uL


 


Eos # (Auto)     (0.0-0.7)  K/uL


 


Baso # (Auto)     (0.0-0.1)  K/uL


 


Add Manual Diff  YES    


 


Neutrophils % (Manual)  83 H    (48.0-80.0)  %


 


Band Neutrophils %  5    %


 


Lymphocytes % (Manual)  6 L    (16.0-40.0)  %


 


Monocytes % (Manual)  5    (0.0-15.0)  %


 


Eosinophils % (Manual)  1    (0.0-7.0)  %


 


Nucleated RBC %  0.0    /100WBC


 


Absolute Seg Neuts  11.6 H    (1.4-5.7)  


 


Band Neutrophils #  0.7    


 


Lymphocytes # (Manual)  0.8    (0.6-2.4)  


 


Monocytes # (Manual)  0.7    (0.0-0.8)  


 


Eosinophils # (Manual)  0.1    (0.0-0.7)  


 


Nucleated RBCs #  0    K/uL


 


INR   1.09   


 


Sodium    135 L  (136-148)  mmol/L


 


Potassium    3.5  (3.5-5.1)  mmol/L


 


Chloride    103  ()  mmol/L


 


Carbon Dioxide    25.4  (21.0-32.0)  mmol/L


 


BUN    20 H  (7.0-18.0)  mg/dL


 


Creatinine    1.3  (0.8-1.3)  mg/dL


 


Est Cr Clr Drug Dosing    60.03  mL/min


 


Estimated GFR (MDRD)    57.3  ml/min


 


Glucose    406 H  ()  mg/dL


 


POC Glucose     ()  mg/dL


 


Hemoglobin A1c     (4.5-6.2)  %


 


Calcium    8.7  (8.5-10.1)  mg/dL


 


Total Bilirubin    1.4 H  (0.2-1.0)  mg/dL


 


AST    61 H  (15-37)  IU/L


 


ALT    97 H  (14-63)  IU/L


 


Alkaline Phosphatase    159 H  ()  U/L


 


Total Protein    6.4  (6.4-8.2)  g/dL


 


Albumin    2.2 L  (3.4-5.0)  g/dL


 


Globulin    4.2 H  (2.0-3.5)  g/dL


 


Albumin/Globulin Ratio    0.5 L  (1.3-2.8)  


 


Urine Color     


 


Urine Appearance     


 


Urine pH     (5.0-8.0)  


 


Ur Specific Gravity     (1.001-1.035)  


 


Urine Protein     (NEGATIVE)  mg/dL


 


Urine Glucose (UA)     (NEGATIVE)  mg/dL


 


Urine Ketones     (NEGATIVE)  mg/dL


 


Urine Occult Blood     (NEGATIVE)  


 


Urine Nitrite     (NEGATIVE)  


 


Urine Bilirubin     (NEGATIVE)  


 


Urine Ictotest     


 


Urine Urobilinogen     (<2.0)  EU/dL


 


Ur Leukocyte Esterase     (NEGATIVE)  


 


Urine RBC     (0-2/HPF)  


 


Urine WBC     (0-5/HPF)  


 


Ur Epithelial Cells     (NONE-FEW)  


 


Urine Bacteria     (NEGATIVE)  














  07/03/18 07/03/18 07/03/18 Range/Units





  11:35 12:27 16:35 


 


WBC     (4.0-11.0)  K/uL


 


RBC     (4.50-5.90)  M/uL


 


Hgb     (13.0-17.0)  g/dL


 


Hct     (38.0-50.0)  %


 


MCV     (80.0-98.0)  fL


 


MCH     (27.0-32.0)  pg


 


MCHC     (31.0-37.0)  g/dL


 


RDW Std Deviation     (28.0-62.0)  fl


 


RDW Coeff of Bozena     (11.0-15.0)  %


 


Plt Count     (150-400)  K/uL


 


MPV     (7.40-12.00)  fL


 


Neut % (Auto)     (48.0-80.0)  %


 


Lymph % (Auto)     (16.0-40.0)  %


 


Mono % (Auto)     (0.0-15.0)  %


 


Eos % (Auto)     (0.0-7.0)  %


 


Baso % (Auto)     (0.0-1.5)  %


 


Neut # (Auto)     (1.4-5.7)  K/uL


 


Lymph # (Auto)     (0.6-2.4)  K/uL


 


Mono # (Auto)     (0.0-0.8)  K/uL


 


Eos # (Auto)     (0.0-0.7)  K/uL


 


Baso # (Auto)     (0.0-0.1)  K/uL


 


Add Manual Diff     


 


Neutrophils % (Manual)     (48.0-80.0)  %


 


Band Neutrophils %     %


 


Lymphocytes % (Manual)     (16.0-40.0)  %


 


Monocytes % (Manual)     (0.0-15.0)  %


 


Eosinophils % (Manual)     (0.0-7.0)  %


 


Nucleated RBC %     /100WBC


 


Absolute Seg Neuts     (1.4-5.7)  


 


Band Neutrophils #     


 


Lymphocytes # (Manual)     (0.6-2.4)  


 


Monocytes # (Manual)     (0.0-0.8)  


 


Eosinophils # (Manual)     (0.0-0.7)  


 


Nucleated RBCs #     K/uL


 


INR     


 


Sodium     (136-148)  mmol/L


 


Potassium     (3.5-5.1)  mmol/L


 


Chloride     ()  mmol/L


 


Carbon Dioxide     (21.0-32.0)  mmol/L


 


BUN     (7.0-18.0)  mg/dL


 


Creatinine     (0.8-1.3)  mg/dL


 


Est Cr Clr Drug Dosing     mL/min


 


Estimated GFR (MDRD)     ml/min


 


Glucose     ()  mg/dL


 


POC Glucose    174 H  ()  mg/dL


 


Hemoglobin A1c  6.8 H    (4.5-6.2)  %


 


Calcium     (8.5-10.1)  mg/dL


 


Total Bilirubin     (0.2-1.0)  mg/dL


 


AST     (15-37)  IU/L


 


ALT     (14-63)  IU/L


 


Alkaline Phosphatase     ()  U/L


 


Total Protein     (6.4-8.2)  g/dL


 


Albumin     (3.4-5.0)  g/dL


 


Globulin     (2.0-3.5)  g/dL


 


Albumin/Globulin Ratio     (1.3-2.8)  


 


Urine Color   YELLOW   


 


Urine Appearance   CLEAR   


 


Urine pH   6.0   (5.0-8.0)  


 


Ur Specific Gravity   <= 1.005   (1.001-1.035)  


 


Urine Protein   30   (NEGATIVE)  mg/dL


 


Urine Glucose (UA)   >=1000   (NEGATIVE)  mg/dL


 


Urine Ketones   TRACE H   (NEGATIVE)  mg/dL


 


Urine Occult Blood   TRACE-LYSED   (NEGATIVE)  


 


Urine Nitrite   NEGATIVE   (NEGATIVE)  


 


Urine Bilirubin   SMALL H   (NEGATIVE)  


 


Urine Ictotest   NEGATIVE   


 


Urine Urobilinogen   >=8.0 H   (<2.0)  EU/dL


 


Ur Leukocyte Esterase   NEGATIVE   (NEGATIVE)  


 


Urine RBC   0-1   (0-2/HPF)  


 


Urine WBC   0-2   (0-5/HPF)  


 


Ur Epithelial Cells   RARE   (NONE-FEW)  


 


Urine Bacteria   RARE   (NEGATIVE)  














  07/04/18 07/04/18 07/04/18 Range/Units





  04:57 04:57 06:14 


 


WBC  13.20 H    (4.0-11.0)  K/uL


 


RBC  4.14 L    (4.50-5.90)  M/uL


 


Hgb  12.1 L    (13.0-17.0)  g/dL


 


Hct  34.9 L    (38.0-50.0)  %


 


MCV  84.3    (80.0-98.0)  fL


 


MCH  29.2    (27.0-32.0)  pg


 


MCHC  34.7    (31.0-37.0)  g/dL


 


RDW Std Deviation  45.9    (28.0-62.0)  fl


 


RDW Coeff of Bozena  15    (11.0-15.0)  %


 


Plt Count  176    (150-400)  K/uL


 


MPV  11.70    (7.40-12.00)  fL


 


Neut % (Auto)  76.7    (48.0-80.0)  %


 


Lymph % (Auto)  12.2 L    (16.0-40.0)  %


 


Mono % (Auto)  8.3    (0.0-15.0)  %


 


Eos % (Auto)  2.5    (0.0-7.0)  %


 


Baso % (Auto)  0.3    (0.0-1.5)  %


 


Neut # (Auto)  10.1 H    (1.4-5.7)  K/uL


 


Lymph # (Auto)  1.6    (0.6-2.4)  K/uL


 


Mono # (Auto)  1.1 H    (0.0-0.8)  K/uL


 


Eos # (Auto)  0.3    (0.0-0.7)  K/uL


 


Baso # (Auto)  0.0    (0.0-0.1)  K/uL


 


Add Manual Diff     


 


Neutrophils % (Manual)     (48.0-80.0)  %


 


Band Neutrophils %     %


 


Lymphocytes % (Manual)     (16.0-40.0)  %


 


Monocytes % (Manual)     (0.0-15.0)  %


 


Eosinophils % (Manual)     (0.0-7.0)  %


 


Nucleated RBC %  0.0    /100WBC


 


Absolute Seg Neuts     (1.4-5.7)  


 


Band Neutrophils #     


 


Lymphocytes # (Manual)     (0.6-2.4)  


 


Monocytes # (Manual)     (0.0-0.8)  


 


Eosinophils # (Manual)     (0.0-0.7)  


 


Nucleated RBCs #  0    K/uL


 


INR     


 


Sodium   142   (136-148)  mmol/L


 


Potassium   3.3 L   (3.5-5.1)  mmol/L


 


Chloride   108 H   ()  mmol/L


 


Carbon Dioxide   23.5   (21.0-32.0)  mmol/L


 


BUN   16   (7.0-18.0)  mg/dL


 


Creatinine   1.0   (0.8-1.3)  mg/dL


 


Est Cr Clr Drug Dosing   78.03   mL/min


 


Estimated GFR (MDRD)   > 60.0   ml/min


 


Glucose   84   ()  mg/dL


 


POC Glucose    80  ()  mg/dL


 


Hemoglobin A1c     (4.5-6.2)  %


 


Calcium   8.1 L   (8.5-10.1)  mg/dL


 


Total Bilirubin   1.3 H   (0.2-1.0)  mg/dL


 


AST   71 H   (15-37)  IU/L


 


ALT   108 H   (14-63)  IU/L


 


Alkaline Phosphatase   135 H   ()  U/L


 


Total Protein   5.7 L   (6.4-8.2)  g/dL


 


Albumin   1.8 L   (3.4-5.0)  g/dL


 


Globulin   3.9 H   (2.0-3.5)  g/dL


 


Albumin/Globulin Ratio   0.5 L   (1.3-2.8)  


 


Urine Color     


 


Urine Appearance     


 


Urine pH     (5.0-8.0)  


 


Ur Specific Gravity     (1.001-1.035)  


 


Urine Protein     (NEGATIVE)  mg/dL


 


Urine Glucose (UA)     (NEGATIVE)  mg/dL


 


Urine Ketones     (NEGATIVE)  mg/dL


 


Urine Occult Blood     (NEGATIVE)  


 


Urine Nitrite     (NEGATIVE)  


 


Urine Bilirubin     (NEGATIVE)  


 


Urine Ictotest     


 


Urine Urobilinogen     (<2.0)  EU/dL


 


Ur Leukocyte Esterase     (NEGATIVE)  


 


Urine RBC     (0-2/HPF)  


 


Urine WBC     (0-5/HPF)  


 


Ur Epithelial Cells     (NONE-FEW)  


 


Urine Bacteria     (NEGATIVE)  











Med Orders - Current: 


 Current Medications





Acetaminophen (Tylenol)  650 mg PO Q4H PRN


   PRN Reason: Pain (mild 1-3)


Amlodipine Besylate (Norvasc)  10 mg PO DAILY [facility]


Aspirin (Aspirin)  325 mg PO BID [facility]


   Last Admin: 07/03/18 20:53 Dose:  325 mg


Heparin Sodium (Porcine) (Heparin Sodium)  5,000 units SUBCUT Q12H [facility]


   Last Admin: 07/04/18 03:29 Dose:  5,000 units


Vancomycin HCl 1,500 mg/ (Sodium Chloride)  500 mls @ 250 mls/hr IV Q12H [facility]


   Last Admin: 07/04/18 06:23 Dose:  250 mls/hr


Insulin Aspart (Novolog)  0 unit SUBCUT TIDAC [facility]; Protocol


   Last Admin: 07/04/18 06:49 Dose:  Not Given


Lisinopril (Prinivil)  40 mg PO DAILY [facility]


Loratadine (Claritin)  10 mg PO DAILY [facility]


   Last Admin: 07/03/18 15:18 Dose:  10 mg


Omeprazole (Omeprazole)  20 mg PO ACBREAKFAST [facility]


   Last Admin: 07/04/18 07:10 Dose:  20 mg


Ondansetron HCl (Zofran)  4 mg IVPUSH Q4H PRN


   PRN Reason: Nausea


Pravastatin Sodium (Pravachol)  40 mg PO BEDTIME [facility]


   Last Admin: 07/03/18 20:53 Dose:  40 mg


Sodium Chloride (Saline Flush)  10 ml FLUSH ASDIRECTED PRN


   PRN Reason: Keep Vein Open


Sodium Chloride (Saline Flush)  2.5 ml FLUSH ASDIRECTED PRN


   PRN Reason: Keep Vein Open


Sodium Chloride (Ocean Nasal Spray)  0 ml AMAN DAILY [facility]


   Last Admin: 07/03/18 15:16 Dose:  1 spray


Valsartan (Diovan)  320 mg PO DAILY [facility]


Vancomycin HCl (Pharmacy To Dose - Vancomycin)  1 dose .XX ASDIRECTED [facility]





Discontinued Medications





Vancomycin HCl 1 gm/ Sodium (Chloride)  250 mls @ 250 mls/hr IV ONETIME ONE


   Stop: 07/03/18 13:42


   Last Admin: 07/03/18 12:53 Dose:  250 mls/hr


Sodium Chloride (Normal Saline)  1,000 mls @ 125 mls/hr IV ASDIRECTED JEREMY


   Last Admin: 07/04/18 03:29 Dose:  125 mls/hr


Potassium Chloride (Klor-Con M20)  40 meq PO ONETIME ONE


   Stop: 07/04/18 07:39











- Exam


Quality Assessment: DVT Prophylaxis.  No: Supplemental Oxygen


General: Alert, Oriented


Neck: Supple


Lungs: Clear to Auscultation, Normal Respiratory Effort


Cardiovascular: Regular Rate, Regular Rhythm


GI/Abdominal Exam: Normal Bowel Sounds, Soft, Non-Tender, Other (obese abdomen 

makes assessment difficult. ).  No: Guarding


Back Exam: Normal Inspection, Full Range of Motion


Extremities: Normal Range of Motion, Pedal Edema (+2 to R lower ex, improving. 

wrinkling noted from decrease in edema.)


Wound/Incisions: Erythema Improving (scant improvement. blistering noted to R 

anterior lower leg. No drainage no fluctunace. Erythema receding from medial 

thigh. No joint pain with movement.)


Neurological: No New Focal Deficit


Psy/Mental Status: Alert, Normal Affect, Normal Mood





- Problem List & Annotations


(1) Cellulitis


SNOMED Code(s): 339211459


   Code(s): L03.90 - CELLULITIS, UNSPECIFIED   Status: Acute   Current Visit: 

Yes   


Qualifiers: 


   Site of cellulitis: extremity   Site of cellulitis of extremity: lower 

extremity   Laterality: right   Qualified Code(s): L03.115 - Cellulitis of 

right lower limb   





(2) Transaminitis


SNOMED Code(s): 688577583, 040094841


   Code(s): R74.0 - NONSPEC ELEV OF LEVELS OF TRANSAMNS & LACTIC ACID 

DEHYDRGNSE   Status: Acute   Current Visit: Yes   





(3) HTN (hypertension)


SNOMED Code(s): 98127797


   Code(s): I10 - ESSENTIAL (PRIMARY) HYPERTENSION   Status: Chronic   Current 

Visit: Yes   


Qualifiers: 


   Hypertension type: essential hypertension   Qualified Code(s): I10 - 

Essential (primary) hypertension   





(4) Morbid obesity


SNOMED Code(s): 919259044


   Code(s): E66.01 - MORBID (SEVERE) OBESITY DUE TO EXCESS CALORIES   Status: 

Chronic   Current Visit: Yes   





(5) DM type 2 (diabetes mellitus, type 2)


SNOMED Code(s): 36887896


   Code(s): E11.9 - TYPE 2 DIABETES MELLITUS WITHOUT COMPLICATIONS   Status: 

Chronic   Current Visit: Yes   


Qualifiers: 


   Diabetes mellitus long term insulin use: without long term use   Diabetes 

mellitus complication status: without complication   Qualified Code(s): E11.9 - 

Type 2 diabetes mellitus without complications   





(6) GERD (gastroesophageal reflux disease)


SNOMED Code(s): 128066745


   Code(s): K21.9 - GASTRO-ESOPHAGEAL REFLUX DISEASE WITHOUT ESOPHAGITIS   

Status: Chronic   Current Visit: Yes   


Qualifiers: 


   Esophagitis presence: esophagitis presence not specified   Qualified Code(s)

: K21.9 - Gastro-esophageal reflux disease without esophagitis   





- Problem List Review


Problem List Initiated/Reviewed/Updated: Yes





- My Orders


Last 24 Hours: 


My Active Orders





07/03/18 14:28


May Shower [RC] ASDIRECTED 


Oxygen Therapy [RC] PRN 


Up ad Luana [RC] ASDIRECTED 


VTE/DVT Education [RC] PER UNIT ROUTINE 


Vital Signs [RC] Q4H 


Acetaminophen [Tylenol]   650 mg PO Q4H PRN 


Ondansetron [Zofran]   4 mg IVPUSH Q4H PRN 


Resuscitation Status Routine 





07/03/18 14:29


Intake and Output [RC] QSHIFT 





07/03/18 14:30


Heparin Sodium   5,000 units SUBCUT Q12H 


Vancomycin Pharmacy to Dose [Pharmacy to Dose - Vancomycin]   1 dose .XX 

ASDIRECTED 





07/03/18 14:31


Elevate Extremity [RC] BID 





07/03/18 14:32


Blood Glucose Check, Bedside [RC] TIDAC 





07/03/18 14:45


Loratadine [Claritin]   10 mg PO DAILY 


Sodium Chloride 0.65% [Ocean Nasal Spray]   See Dose Instructions  AMAN DAILY 





07/03/18 17:00


Insulin Aspart [NovoLOG]   See Protocol  SUBCUT TIDAC 





07/03/18 18:00


Vancomycin 1,500 mg   Sodium Chloride 0.9% [Normal Saline] 500 ml IV Q12H 





07/03/18 21:00


Aspirin   325 mg PO BID 


Pravastatin [Pravachol]   40 mg PO BEDTIME 





07/03/18 Dinner


[employer] Diet [DIET] 





07/04/18 07:30


Omeprazole   20 mg PO ACBREAKFAST 





07/04/18 07:37


Abdomen Ltd [US] Routine 


HEPATITIS PANEL (4) [REF] Routine 





07/04/18 09:00


Lisinopril [Prinivil]   40 mg PO DAILY 


Valsartan [Diovan]   320 mg PO DAILY 


amLODIPine [Norvasc]   10 mg PO DAILY 





07/05/18 05:00


VANCOMYCIN TROUGH [CHEM] Routine 





07/05/18 05:11


CBC WITH AUTO DIFF [HEME] AM 














- Plan


Plan:: 





This 57 year old male admitted with R ower leg cellulitis





1. R lower leg cellulitis: Slight improvement since admission yesterday 

afternoon. Will continue Vancomycin. Keep leg elevated as much as possible. 

Leukocytosis remains 13,000.  MOnitor labwork in am.





2. HTN: BPS elevated to 150-160 SBP. Will restart BP medications this morning. 





3. Dm type 2: A1c 6.8.  BS control good. Hold oral medications. Novolog SSI and 

monitor BS TIDAC. 





4. Elevated LFTs: Denies known history of this. May be related to DM and morbid 

obesity. Will obtain Hepatitis panel and Abd U/S. No abdominal pain and none 

after eating either. 





VTE prophylaxis: Heparin





Dispo: 2-3 days pending improvement.

## 2018-07-05 LAB
CHLORIDE SERPL-SCNC: 105 MMOL/L (ref 98–107)
SODIUM SERPL-SCNC: 139 MMOL/L (ref 136–148)

## 2018-07-05 RX ADMIN — DEXTROSE SCH UNITS: 10 SOLUTION INTRAVENOUS at 15:04

## 2018-07-05 RX ADMIN — OMEPRAZOLE SCH MG: 20 CAPSULE, DELAYED RELEASE ORAL at 07:01

## 2018-07-05 RX ADMIN — DEXTROSE SCH UNITS: 10 SOLUTION INTRAVENOUS at 03:24

## 2018-07-05 NOTE — PCM.PN
- General Info


Date of Service: 07/05/18


Admission Dx/Problem (Free Text): 


 Admission Diagnosis/Problem





Admission Diagnosis/Problem      Cellulitis








Subjective Update: 





Conitnues to improve daily. Reports pain and swelling to R lower leg is 

improving. Blisters now open and draining serous fluid. Denies chest pain or 

SOB. No other complaints. Still has dry hacking cough.


Functional Status: Reports: Pain Controlled, Tolerating Diet, Ambulating, 

Urinating





- Review of Systems


General: Reports: No Symptoms


HEENT: Denies: Headaches, Sinus Congestion, Visual Changes


Pulmonary: Reports: Cough.  Denies: Shortness of Breath, Sputum, Hemoptysis, 

Wheezing


Cardiovascular: Reports: No Symptoms.  Denies: Chest Pain, Palpitations, 

Lightheadedness


Gastrointestinal: Reports: No Symptoms.  Denies: Abdominal Pain, Nausea, 

Vomiting


Genitourinary: Reports: No Symptoms.  Denies: Dysuria, Frequency, Burning


Skin: Reports: Other (redness to R lower leg improving. )


Neurological: Reports: No Symptoms


Psychiatric: Reports: No Symptoms





- Patient Data


Vitals - Most Recent: 


 Last Vital Signs











Temp  98.1 F   07/05/18 07:53


 


Pulse  109 H  07/05/18 07:53


 


Resp  18   07/05/18 07:53


 


BP  183/91 H  07/05/18 07:53


 


Pulse Ox  95   07/05/18 07:53











Weight - Most Recent: 153.6 kg


I&O - Last 24 Hours: 


 Intake & Output











 07/04/18 07/05/18 07/05/18





 22:59 06:59 14:59


 


Intake Total 1400 300 


 


Output Total 1100 800 


 


Balance 300 -500 











Lab Results Last 24 Hours: 


 Laboratory Results - last 24 hr











  07/04/18 07/04/18 07/05/18 Range/Units





  11:37 16:20 05:03 


 


WBC    11.90 H  (4.0-11.0)  K/uL


 


RBC    4.18 L  (4.50-5.90)  M/uL


 


Hgb    12.2 L  (13.0-17.0)  g/dL


 


Hct    35.0 L  (38.0-50.0)  %


 


MCV    83.7  (80.0-98.0)  fL


 


MCH    29.2  (27.0-32.0)  pg


 


MCHC    34.9  (31.0-37.0)  g/dL


 


RDW Std Deviation    45.2  (28.0-62.0)  fl


 


RDW Coeff of Bozena    15  (11.0-15.0)  %


 


Plt Count    193  (150-400)  K/uL


 


MPV    11.30  (7.40-12.00)  fL


 


Add Manual Diff    YES  


 


Neutrophils % (Manual)    64  (48.0-80.0)  %


 


Band Neutrophils %    8  %


 


Lymphocytes % (Manual)    15 L  (16.0-40.0)  %


 


Monocytes % (Manual)    7  (0.0-15.0)  %


 


Eosinophils % (Manual)    5  (0.0-7.0)  %


 


Metamyelocytes %    1  %


 


Nucleated RBC %    0.0  /100WBC


 


Absolute Seg Neuts    7.6 H  (1.4-5.7)  


 


Band Neutrophils #    1.0  


 


Lymphocytes # (Manual)    1.8  (0.6-2.4)  


 


Monocytes # (Manual)    0.8  (0.0-0.8)  


 


Eosinophils # (Manual)    0.6  (0.0-0.7)  


 


Absolute Metamyelocyte    0.1  


 


Nucleated RBCs #    0  K/uL


 


Sodium     (136-148)  mmol/L


 


Potassium     (3.5-5.1)  mmol/L


 


Chloride     ()  mmol/L


 


Carbon Dioxide     (21.0-32.0)  mmol/L


 


BUN     (7.0-18.0)  mg/dL


 


Creatinine     (0.8-1.3)  mg/dL


 


Est Cr Clr Drug Dosing     mL/min


 


Estimated GFR (MDRD)     ml/min


 


Glucose     ()  mg/dL


 


POC Glucose  132 H  194 H   ()  mg/dL


 


Calcium     (8.5-10.1)  mg/dL


 


Total Bilirubin     (0.2-1.0)  mg/dL


 


AST     (15-37)  IU/L


 


ALT     (14-63)  IU/L


 


Alkaline Phosphatase     ()  U/L


 


Total Protein     (6.4-8.2)  g/dL


 


Albumin     (3.4-5.0)  g/dL


 


Globulin     (2.0-3.5)  g/dL


 


Albumin/Globulin Ratio     (1.3-2.8)  


 


Vancomycin Trough     (5.0-10.0)  ug/mL














  07/05/18 07/05/18 07/05/18 Range/Units





  05:03 05:03 06:18 


 


WBC     (4.0-11.0)  K/uL


 


RBC     (4.50-5.90)  M/uL


 


Hgb     (13.0-17.0)  g/dL


 


Hct     (38.0-50.0)  %


 


MCV     (80.0-98.0)  fL


 


MCH     (27.0-32.0)  pg


 


MCHC     (31.0-37.0)  g/dL


 


RDW Std Deviation     (28.0-62.0)  fl


 


RDW Coeff of Bozena     (11.0-15.0)  %


 


Plt Count     (150-400)  K/uL


 


MPV     (7.40-12.00)  fL


 


Add Manual Diff     


 


Neutrophils % (Manual)     (48.0-80.0)  %


 


Band Neutrophils %     %


 


Lymphocytes % (Manual)     (16.0-40.0)  %


 


Monocytes % (Manual)     (0.0-15.0)  %


 


Eosinophils % (Manual)     (0.0-7.0)  %


 


Metamyelocytes %     %


 


Nucleated RBC %     /100WBC


 


Absolute Seg Neuts     (1.4-5.7)  


 


Band Neutrophils #     


 


Lymphocytes # (Manual)     (0.6-2.4)  


 


Monocytes # (Manual)     (0.0-0.8)  


 


Eosinophils # (Manual)     (0.0-0.7)  


 


Absolute Metamyelocyte     


 


Nucleated RBCs #     K/uL


 


Sodium   139   (136-148)  mmol/L


 


Potassium   3.3 L   (3.5-5.1)  mmol/L


 


Chloride   105   ()  mmol/L


 


Carbon Dioxide   24.5   (21.0-32.0)  mmol/L


 


BUN   14   (7.0-18.0)  mg/dL


 


Creatinine   1.1   (0.8-1.3)  mg/dL


 


Est Cr Clr Drug Dosing   70.94   mL/min


 


Estimated GFR (MDRD)   > 60.0   ml/min


 


Glucose   208 H   ()  mg/dL


 


POC Glucose    183 H  ()  mg/dL


 


Calcium   8.0 L   (8.5-10.1)  mg/dL


 


Total Bilirubin   1.3 H   (0.2-1.0)  mg/dL


 


AST   86 H   (15-37)  IU/L


 


ALT   140 H   (14-63)  IU/L


 


Alkaline Phosphatase   166 H   ()  U/L


 


Total Protein   5.9 L   (6.4-8.2)  g/dL


 


Albumin   1.8 L   (3.4-5.0)  g/dL


 


Globulin   4.1 H   (2.0-3.5)  g/dL


 


Albumin/Globulin Ratio   0.4 L   (1.3-2.8)  


 


Vancomycin Trough  9.5    (5.0-10.0)  ug/mL











Nacho Results Last 24 Hours: 


 Microbiology











 07/03/18 12:53 Aerobic Blood Culture - Preliminary





 Blood - Venous - Lab Draw    NO GROWTH AFTER 1 DAY





 Anaerobic Blood Culture - Preliminary





    NO GROWTH AFTER 1 DAY


 


 07/03/18 11:35 Aerobic Blood Culture - Preliminary





 Blood - Venous    NO GROWTH AFTER 1 DAY





 Anaerobic Blood Culture - Preliminary





    NO GROWTH AFTER 1 DAY











Med Orders - Current: 


 Current Medications





Acetaminophen (Tylenol)  650 mg PO Q4H PRN


   PRN Reason: Pain (mild 1-3)


Amlodipine Besylate (Norvasc)  10 mg PO DAILY [facility]


   Last Admin: 07/04/18 08:12 Dose:  10 mg


Aspirin (Aspirin)  325 mg PO BID [facility]


   Last Admin: 07/04/18 20:26 Dose:  325 mg


Benzonatate (Tessalon Perles)  100 mg PO TID PRN


   PRN Reason: Cough


   Last Admin: 07/04/18 16:53 Dose:  100 mg


Heparin Sodium (Porcine) (Heparin Sodium)  5,000 units SUBCUT Q12H [facility]


   Last Admin: 07/05/18 03:24 Dose:  5,000 units


Vancomycin HCl 2 gm/ Sodium (Chloride)  500 mls @ 333.333 mls/hr IV Q12H [facility]


Insulin Aspart (Novolog)  0 unit SUBCUT TIDAC [facility]; Protocol


   Last Admin: 07/04/18 17:28 Dose:  2 units


Lisinopril (Prinivil)  40 mg PO DAILY [facility]


   Last Admin: 07/04/18 08:11 Dose:  40 mg


Loratadine (Claritin)  10 mg PO DAILY [facility]


   Last Admin: 07/04/18 08:12 Dose:  10 mg


Omeprazole (Omeprazole)  20 mg PO ACBREAKFAST [facility]


   Last Admin: 07/05/18 07:01 Dose:  20 mg


Ondansetron HCl (Zofran)  4 mg IVPUSH Q4H PRN


   PRN Reason: Nausea


Sodium Chloride (Saline Flush)  10 ml FLUSH ASDIRECTED PRN


   PRN Reason: Keep Vein Open


Sodium Chloride (Saline Flush)  2.5 ml FLUSH ASDIRECTED PRN


   PRN Reason: Keep Vein Open


Sodium Chloride (Ocean Nasal Spray)  0 ml AMAN DAILY [facility]


   Last Admin: 07/04/18 08:13 Dose:  1 spray


Valsartan (Diovan)  320 mg PO DAILY [facility]


   Last Admin: 07/04/18 08:09 Dose:  320 mg


Vancomycin HCl (Pharmacy To Dose - Vancomycin)  1 dose .XX ASDIRECTED [facility]





Discontinued Medications





Vancomycin HCl 1 gm/ Sodium (Chloride)  250 mls @ 250 mls/hr IV ONETIME ONE


   Stop: 07/03/18 13:42


   Last Admin: 07/03/18 12:53 Dose:  250 mls/hr


Sodium Chloride (Normal Saline)  1,000 mls @ 125 mls/hr IV ASDIRECTED [facility]


   Last Admin: 07/04/18 03:29 Dose:  125 mls/hr


Vancomycin HCl 1,500 mg/ (Sodium Chloride)  500 mls @ 250 mls/hr IV Q12H [facility]


   Last Admin: 07/05/18 06:21 Dose:  Not Given


Potassium Chloride (Klor-Con M20)  40 meq PO ONETIME ONE


   Stop: 07/04/18 07:39


   Last Admin: 07/04/18 08:10 Dose:  40 meq


Potassium Chloride (Klor-Con M20)  40 meq PO ONETIME ONE


   Stop: 07/05/18 07:20


Pravastatin Sodium (Pravachol)  40 mg PO BEDTIME [facility]


   Last Admin: 07/04/18 20:26 Dose:  40 mg











- Exam


General: Alert, Oriented, Cooperative, No Acute Distress


Neck: Supple


Lungs: Clear to Auscultation, Normal Respiratory Effort


Cardiovascular: Regular Rate, Regular Rhythm


GI/Abdominal Exam: Normal Bowel Sounds, Soft, Non-Tender


Extremities: Normal Range of Motion, Pedal Edema (+1 pitting to R lower leg, 

slow improvement.)


Wound/Incisions: Drainage (serous drainage from blisters to anterior R lower 

leg on mid shin. No fluctuant areas. Slow improvement.), Erythema Improving


Neurological: No New Focal Deficit


Psy/Mental Status: Alert, Normal Affect, Normal Mood





- Problem List & Annotations


(1) Cellulitis


SNOMED Code(s): 199441480


   Code(s): L03.90 - CELLULITIS, UNSPECIFIED   Status: Acute   Current Visit: 

Yes   


Qualifiers: 


   Site of cellulitis: extremity   Site of cellulitis of extremity: lower 

extremity   Laterality: right   Qualified Code(s): L03.115 - Cellulitis of 

right lower limb   





(2) Transaminitis


SNOMED Code(s): 133417255, 275071894


   Code(s): R74.0 - NONSPEC ELEV OF LEVELS OF TRANSAMNS & LACTIC ACID 

DEHYDRGNSE   Status: Acute   Current Visit: Yes   





(3) HTN (hypertension)


SNOMED Code(s): 88014936


   Code(s): I10 - ESSENTIAL (PRIMARY) HYPERTENSION   Status: Chronic   Current 

Visit: Yes   


Qualifiers: 


   Hypertension type: essential hypertension   Qualified Code(s): I10 - 

Essential (primary) hypertension   





(4) Morbid obesity


SNOMED Code(s): 896372862


   Code(s): E66.01 - MORBID (SEVERE) OBESITY DUE TO EXCESS CALORIES   Status: 

Chronic   Current Visit: Yes   





(5) DM type 2 (diabetes mellitus, type 2)


SNOMED Code(s): 96780499


   Code(s): E11.9 - TYPE 2 DIABETES MELLITUS WITHOUT COMPLICATIONS   Status: 

Chronic   Current Visit: Yes   


Qualifiers: 


   Diabetes mellitus long term insulin use: without long term use   Diabetes 

mellitus complication status: without complication   Qualified Code(s): E11.9 - 

Type 2 diabetes mellitus without complications   





(6) GERD (gastroesophageal reflux disease)


SNOMED Code(s): 266807120


   Code(s): K21.9 - GASTRO-ESOPHAGEAL REFLUX DISEASE WITHOUT ESOPHAGITIS   

Status: Chronic   Current Visit: Yes   


Qualifiers: 


   Esophagitis presence: esophagitis presence not specified   Qualified Code(s)

: K21.9 - Gastro-esophageal reflux disease without esophagitis   





- Problem List Review


Problem List Initiated/Reviewed/Updated: Yes





- My Orders


Last 24 Hours: 


My Active Orders





07/04/18 07:30


Omeprazole   20 mg PO ACBREAKFAST 





07/04/18 07:37


Abdomen Ltd [US] Routine 





07/04/18 07:59


HEPATITIS PANEL (4) [REF] Routine 





07/04/18 08:31


Benzonatate [Tessalon Perles]   100 mg PO TID PRN 





07/04/18 09:00


Lisinopril [Prinivil]   40 mg PO DAILY 


Valsartan [Diovan]   320 mg PO DAILY 


amLODIPine [Norvasc]   10 mg PO DAILY 














- Plan


Plan:: 





This 57 year old male admitted with R ower leg cellulitis





1. R lower leg cellulitis: Slow improvement continues Continue Vancomycin. Keep 

leg elevated as much as possible. Leukocytosis improving 11,900.  Monitor 

labwork in am.





2. HTN: BPS elevated to 160-170s, will monitor. All home medications restarted. 

Patient reports BP are normally near this. 





3. Dm type 2: A1c 6.8.  BS control good. Hold oral medications. Novolog SSI and 

monitor BS TIDAC. 





4. Elevated LFTs: Denies known history of this. Hepatitis panel pending. Abd U/

S reveals mild fatty infiltration, poor evaluation obtained due to body 

habitus. Will hold Statin.





VTE prophylaxis: Heparin





Dispo: 2-3 days pending improvement.

## 2018-07-06 LAB
CHLORIDE SERPL-SCNC: 104 MMOL/L (ref 98–107)
SODIUM SERPL-SCNC: 142 MMOL/L (ref 136–148)

## 2018-07-06 RX ADMIN — DEXTROSE SCH UNITS: 10 SOLUTION INTRAVENOUS at 02:22

## 2018-07-06 RX ADMIN — DEXTROSE SCH UNITS: 10 SOLUTION INTRAVENOUS at 15:33

## 2018-07-06 RX ADMIN — OMEPRAZOLE SCH MG: 20 CAPSULE, DELAYED RELEASE ORAL at 06:30

## 2018-07-06 NOTE — PCM.PN
- General Info


Date of Service: 07/06/18


Admission Dx/Problem (Free Text): 


 Admission Diagnosis/Problem





Admission Diagnosis/Problem      Cellulitis








Subjective Update: 





Feeling improved. No chest pain or SOB. Cough continues but is improved with 

Tessalon perles and Loratidine. Pain to R lower leg is improving along with 

swelling. No pain in joints.


Functional Status: Reports: Pain Controlled, Tolerating Diet, Ambulating, 

Urinating





- Review of Systems


General: Denies: Fever, Fatigue, Malaise


HEENT: Reports: No Symptoms.  Denies: Headaches, Sore Throat, Visual Changes


Pulmonary: Reports: Cough.  Denies: Shortness of Breath, Sputum


Cardiovascular: Reports: No Symptoms.  Denies: Chest Pain, Palpitations, Edema


Gastrointestinal: Reports: No Symptoms.  Denies: Abdominal Pain, Nausea, 

Vomiting


Genitourinary: Reports: No Symptoms.  Denies: Dysuria, Frequency, Burning


Skin: Reports: Other (Redness to R lower leg, scant improvement per patient)


Neurological: Reports: No Symptoms


Psychiatric: Reports: No Symptoms





- Patient Data


Vitals - Most Recent: 


 Last Vital Signs











Temp  97.3 F   07/06/18 08:00


 


Pulse  100   07/06/18 08:00


 


Resp  16   07/06/18 08:00


 


BP  177/95 H  07/06/18 08:08


 


Pulse Ox  96   07/06/18 08:00











Weight - Most Recent: 153.6 kg


I&O - Last 24 Hours: 


 Intake & Output











 07/05/18 07/06/18 07/06/18





 22:59 06:59 14:59


 


Intake Total 1200 980 


 


Output Total 1030 1575 


 


Balance 170 -595 











Lab Results Last 24 Hours: 


 Laboratory Results - last 24 hr











  07/05/18 07/05/18 07/06/18 Range/Units





  11:13 16:16 06:55 


 


WBC    14.61 H  (4.0-11.0)  K/uL


 


RBC    4.33 L  (4.50-5.90)  M/uL


 


Hgb    12.8 L  (13.0-17.0)  g/dL


 


Hct    36.3 L  (38.0-50.0)  %


 


MCV    83.8  (80.0-98.0)  fL


 


MCH    29.6  (27.0-32.0)  pg


 


MCHC    35.3  (31.0-37.0)  g/dL


 


RDW Std Deviation    43.6  (28.0-62.0)  fl


 


RDW Coeff of Bozena    15  (11.0-15.0)  %


 


Plt Count    188  (150-400)  K/uL


 


MPV    11.30  (7.40-12.00)  fL


 


Add Manual Diff    YES  


 


Neutrophils % (Manual)    74  (48.0-80.0)  %


 


Band Neutrophils %    8  %


 


Lymphocytes % (Manual)    11 L  (16.0-40.0)  %


 


Monocytes % (Manual)    5  (0.0-15.0)  %


 


Eosinophils % (Manual)    2  (0.0-7.0)  %


 


Absolute Seg Neuts    10.8 H  (1.4-5.7)  


 


Band Neutrophils #    1.2  


 


Lymphocytes # (Manual)    1.6  (0.6-2.4)  


 


Monocytes # (Manual)    0.7  (0.0-0.8)  


 


Eosinophils # (Manual)    0.3  (0.0-0.7)  


 


Sodium     (136-148)  mmol/L


 


Potassium     (3.5-5.1)  mmol/L


 


Chloride     ()  mmol/L


 


Carbon Dioxide     (21.0-32.0)  mmol/L


 


BUN     (7.0-18.0)  mg/dL


 


Creatinine     (0.8-1.3)  mg/dL


 


Est Cr Clr Drug Dosing     mL/min


 


Estimated GFR (MDRD)     ml/min


 


Glucose     ()  mg/dL


 


POC Glucose  313 H  349 H   ()  mg/dL


 


Calcium     (8.5-10.1)  mg/dL


 


Total Bilirubin     (0.2-1.0)  mg/dL


 


AST     (15-37)  IU/L


 


ALT     (14-63)  IU/L


 


Alkaline Phosphatase     ()  U/L


 


Total Protein     (6.4-8.2)  g/dL


 


Albumin     (3.4-5.0)  g/dL


 


Globulin     (2.0-3.5)  g/dL


 


Albumin/Globulin Ratio     (1.3-2.8)  














  07/06/18 Range/Units





  07:15 


 


WBC   (4.0-11.0)  K/uL


 


RBC   (4.50-5.90)  M/uL


 


Hgb   (13.0-17.0)  g/dL


 


Hct   (38.0-50.0)  %


 


MCV   (80.0-98.0)  fL


 


MCH   (27.0-32.0)  pg


 


MCHC   (31.0-37.0)  g/dL


 


RDW Std Deviation   (28.0-62.0)  fl


 


RDW Coeff of Bozena   (11.0-15.0)  %


 


Plt Count   (150-400)  K/uL


 


MPV   (7.40-12.00)  fL


 


Add Manual Diff   


 


Neutrophils % (Manual)   (48.0-80.0)  %


 


Band Neutrophils %   %


 


Lymphocytes % (Manual)   (16.0-40.0)  %


 


Monocytes % (Manual)   (0.0-15.0)  %


 


Eosinophils % (Manual)   (0.0-7.0)  %


 


Absolute Seg Neuts   (1.4-5.7)  


 


Band Neutrophils #   


 


Lymphocytes # (Manual)   (0.6-2.4)  


 


Monocytes # (Manual)   (0.0-0.8)  


 


Eosinophils # (Manual)   (0.0-0.7)  


 


Sodium  142  (136-148)  mmol/L


 


Potassium  3.5  (3.5-5.1)  mmol/L


 


Chloride  104  ()  mmol/L


 


Carbon Dioxide  25.5  (21.0-32.0)  mmol/L


 


BUN  12  (7.0-18.0)  mg/dL


 


Creatinine  1.1  (0.8-1.3)  mg/dL


 


Est Cr Clr Drug Dosing  70.94  mL/min


 


Estimated GFR (MDRD)  > 60.0  ml/min


 


Glucose  185 H  ()  mg/dL


 


POC Glucose   ()  mg/dL


 


Calcium  7.6 L  (8.5-10.1)  mg/dL


 


Total Bilirubin  1.3 H  (0.2-1.0)  mg/dL


 


AST  73 H  (15-37)  IU/L


 


ALT  160 H  (14-63)  IU/L


 


Alkaline Phosphatase  175 H  ()  U/L


 


Total Protein  5.9 L  (6.4-8.2)  g/dL


 


Albumin  2.1 L  (3.4-5.0)  g/dL


 


Globulin  3.8 H  (2.0-3.5)  g/dL


 


Albumin/Globulin Ratio  0.6 L  (1.3-2.8)  











Nacho Results Last 24 Hours: 


 Microbiology











 07/03/18 12:53 Aerobic Blood Culture - Preliminary





 Blood - Venous - Lab Draw    NO GROWTH AFTER 2 DAYS





 Anaerobic Blood Culture - Preliminary





    NO GROWTH AFTER 2 DAYS


 


 07/03/18 11:35 Aerobic Blood Culture - Preliminary





 Blood - Venous    NO GROWTH AFTER 2 DAYS





 Anaerobic Blood Culture - Preliminary





    NO GROWTH AFTER 2 DAYS











Med Orders - Current: 


 Current Medications





Acetaminophen (Tylenol)  650 mg PO Q4H PRN


   PRN Reason: Pain (mild 1-3)


Amlodipine Besylate (Norvasc)  10 mg PO DAILY [facility]


   Last Admin: 07/06/18 08:07 Dose:  10 mg


Aspirin (Aspirin)  325 mg PO BID [facility]


   Last Admin: 07/06/18 08:06 Dose:  325 mg


Benzonatate (Tessalon Perles)  100 mg PO TID PRN


   PRN Reason: Cough


   Last Admin: 07/06/18 06:31 Dose:  100 mg


Heparin Sodium (Porcine) (Heparin Sodium)  5,000 units SUBCUT Q12H [facility]


   Last Admin: 07/06/18 02:22 Dose:  5,000 units


Vancomycin HCl 2 gm/ Sodium (Chloride)  500 mls @ 333.333 mls/hr IV Q12H [facility]


   Last Admin: 07/06/18 06:29 Dose:  333.333 mls/hr


Insulin Aspart (Novolog)  0 unit SUBCUT TIDAC [facility]; Protocol


   Last Admin: 07/06/18 08:02 Dose:  2 units


Lisinopril (Prinivil)  40 mg PO DAILY [facility]


   Last Admin: 07/06/18 08:06 Dose:  40 mg


Loratadine (Claritin)  10 mg PO DAILY [facility]


   Last Admin: 07/06/18 08:08 Dose:  10 mg


Omeprazole (Omeprazole)  20 mg PO ACBREAKFAST [facility]


   Last Admin: 07/06/18 06:30 Dose:  20 mg


Ondansetron HCl (Zofran)  4 mg IVPUSH Q4H PRN


   PRN Reason: Nausea


Sodium Chloride (Saline Flush)  10 ml FLUSH ASDIRECTED PRN


   PRN Reason: Keep Vein Open


Sodium Chloride (Saline Flush)  2.5 ml FLUSH ASDIRECTED PRN


   PRN Reason: Keep Vein Open


Sodium Chloride (Ocean Nasal Spray)  0 ml AMAN DAILY [facility]


   Last Admin: 07/06/18 08:08 Dose:  Not Given


Valsartan (Diovan)  320 mg PO DAILY [facility]


   Last Admin: 07/06/18 08:08 Dose:  320 mg


Vancomycin HCl (Pharmacy To Dose - Vancomycin)  1 dose .XX ASDIRECTED [facility]





Discontinued Medications





Vancomycin HCl 1 gm/ Sodium (Chloride)  250 mls @ 250 mls/hr IV ONETIME ONE


   Stop: 07/03/18 13:42


   Last Admin: 07/03/18 12:53 Dose:  250 mls/hr


Sodium Chloride (Normal Saline)  1,000 mls @ 125 mls/hr IV ASDIRECTED [facility]


   Last Admin: 07/04/18 03:29 Dose:  125 mls/hr


Vancomycin HCl 1,500 mg/ (Sodium Chloride)  500 mls @ 250 mls/hr IV Q12H [facility]


   Last Admin: 07/05/18 06:21 Dose:  Not Given


Potassium Chloride (Klor-Con M20)  40 meq PO ONETIME ONE


   Stop: 07/04/18 07:39


   Last Admin: 07/04/18 08:10 Dose:  40 meq


Potassium Chloride (Klor-Con M20)  40 meq PO ONETIME ONE


   Stop: 07/05/18 07:20


   Last Admin: 07/05/18 08:12 Dose:  40 meq


Pravastatin Sodium (Pravachol)  40 mg PO BEDTIME [facility]


   Last Admin: 07/04/18 20:26 Dose:  40 mg











- Exam


Quality Assessment: DVT Prophylaxis.  No: Supplemental Oxygen


General: Alert, Oriented, Cooperative, No Acute Distress


Neck: Supple


Lungs: Clear to Auscultation, Normal Respiratory Effort


Cardiovascular: Regular Rate, Regular Rhythm, No Murmurs


GI/Abdominal Exam: Normal Bowel Sounds, Soft, Non-Tender, Other (obese abdomen)


Extremities: Normal Range of Motion, Pedal Edema (+2 pitting edema to R lower 

leg.)


Wound/Incisions: Drainage (serous drainge from blisters to anterior shin. Scant 

pustules with minimally purulent drainage noted. ), Erythema (Erythema improved 

very little, less bright red, but very warm to palpation still and appears to 

be spreading up medial thigh and to anterior thigh as well. No increasing pain. 

Pain is tolerable and not worse. No pain in movement of knee, ankle or toes. No 

fluctuant areas noted. )


Neurological: No New Focal Deficit


Psy/Mental Status: Alert, Normal Affect, Normal Mood





- Problem List & Annotations


(1) Cellulitis


SNOMED Code(s): 806033190


   Code(s): L03.90 - CELLULITIS, UNSPECIFIED   Status: Acute   Current Visit: 

Yes   


Qualifiers: 


   Site of cellulitis: extremity   Site of cellulitis of extremity: lower 

extremity   Laterality: right   Qualified Code(s): L03.115 - Cellulitis of 

right lower limb   





(2) Transaminitis


SNOMED Code(s): 026710856, 973968177


   Code(s): R74.0 - NONSPEC ELEV OF LEVELS OF TRANSAMNS & LACTIC ACID 

DEHYDRGNSE   Status: Acute   Current Visit: Yes   





(3) HTN (hypertension)


SNOMED Code(s): 72452938


   Code(s): I10 - ESSENTIAL (PRIMARY) HYPERTENSION   Status: Chronic   Current 

Visit: Yes   


Qualifiers: 


   Hypertension type: essential hypertension   Qualified Code(s): I10 - 

Essential (primary) hypertension   





(4) Morbid obesity


SNOMED Code(s): 464684075


   Code(s): E66.01 - MORBID (SEVERE) OBESITY DUE TO EXCESS CALORIES   Status: 

Chronic   Current Visit: Yes   





(5) DM type 2 (diabetes mellitus, type 2)


SNOMED Code(s): 71480658


   Code(s): E11.9 - TYPE 2 DIABETES MELLITUS WITHOUT COMPLICATIONS   Status: 

Chronic   Current Visit: Yes   


Qualifiers: 


   Diabetes mellitus long term insulin use: without long term use   Diabetes 

mellitus complication status: without complication   Qualified Code(s): E11.9 - 

Type 2 diabetes mellitus without complications   





(6) GERD (gastroesophageal reflux disease)


SNOMED Code(s): 324302215


   Code(s): K21.9 - GASTRO-ESOPHAGEAL REFLUX DISEASE WITHOUT ESOPHAGITIS   

Status: Chronic   Current Visit: Yes   


Qualifiers: 


   Esophagitis presence: esophagitis presence not specified   Qualified Code(s)

: K21.9 - Gastro-esophageal reflux disease without esophagitis   





- Problem List Review


Problem List Initiated/Reviewed/Updated: Yes





- My Orders


Last 24 Hours: 


My Active Orders





07/07/18 05:11


CBC WITH AUTO DIFF [HEME] AM 


COMPREHENSIVE METABOLIC PN,CMP [CHEM] AM 





07/08/18 05:11


CBC WITH AUTO DIFF [HEME] AM 


COMPREHENSIVE METABOLIC PN,CMP [CHEM] AM 














- Plan


Plan:: 





This 57 year old male admitted with R ower leg cellulitis





1. R lower leg cellulitis: Slow improvement continues, Leukocytosis increased 

to 14,000 today Continue Vancomycin but with minimal improvement, will add 

Zosyn and monitor. Keep leg elevated as much as possible. Monitor labwork in am.





2. HTN: BPS elevated to 160-170s, will monitor. All home medications restarted. 

Patient reports BP are normally near this. 





3. Dm type 2: A1c 6.8.  BS control good. Hold oral medications. Novolog SSI and 

monitor BS TIDAC. 





4. Elevated LFTs: Denies known history of this. Hepatitis panel pending. Abd U/

S reveals mild fatty infiltration, poor evaluation obtained due to body 

habitus. Will hold Statin.





VTE prophylaxis: Heparin





Dispo: 2-3 days pending improvement.

## 2018-07-07 LAB
CHLORIDE SERPL-SCNC: 104 MMOL/L (ref 98–107)
SODIUM SERPL-SCNC: 140 MMOL/L (ref 136–148)

## 2018-07-07 RX ADMIN — OMEPRAZOLE SCH MG: 20 CAPSULE, DELAYED RELEASE ORAL at 06:41

## 2018-07-07 RX ADMIN — DEXTROSE SCH UNITS: 10 SOLUTION INTRAVENOUS at 02:20

## 2018-07-07 RX ADMIN — DEXTROSE SCH UNITS: 10 SOLUTION INTRAVENOUS at 15:01

## 2018-07-07 NOTE — PCM.PN
- General Info


Date of Service: 07/07/18





- Review of Systems


Systems Review Comment:: 


erythema and pain has improved.





- Patient Data


Vitals - Most Recent: 


 Last Vital Signs











Temp  36.6 C   07/07/18 07:44


 


Pulse  96   07/07/18 07:44


 


Resp  19   07/07/18 07:44


 


BP  158/95 H  07/07/18 08:28


 


Pulse Ox  97   07/07/18 07:44











Weight - Most Recent: 153.6 kg


I&O - Last 24 Hours: 


 Intake & Output











 07/06/18 07/07/18 07/07/18





 22:59 06:59 14:59


 


Intake Total 1420 500 600


 


Output Total 1935 1400 


 


Balance -515 -900 600











Lab Results Last 24 Hours: 


 Laboratory Results - last 24 hr











  07/04/18 07/06/18 07/06/18 Range/Units





  07:59 06:46 11:49 


 


WBC     (4.0-11.0)  K/uL


 


RBC     (4.50-5.90)  M/uL


 


Hgb     (13.0-17.0)  g/dL


 


Hct     (38.0-50.0)  %


 


MCV     (80.0-98.0)  fL


 


MCH     (27.0-32.0)  pg


 


MCHC     (31.0-37.0)  g/dL


 


RDW Std Deviation     (28.0-62.0)  fl


 


RDW Coeff of Bozena     (11.0-15.0)  %


 


Plt Count     (150-400)  K/uL


 


MPV     (7.40-12.00)  fL


 


Neut % (Auto)     (48.0-80.0)  %


 


Lymph % (Auto)     (16.0-40.0)  %


 


Mono % (Auto)     (0.0-15.0)  %


 


Eos % (Auto)     (0.0-7.0)  %


 


Baso % (Auto)     (0.0-1.5)  %


 


Neut # (Auto)     (1.4-5.7)  K/uL


 


Lymph # (Auto)     (0.6-2.4)  K/uL


 


Mono # (Auto)     (0.0-0.8)  K/uL


 


Eos # (Auto)     (0.0-0.7)  K/uL


 


Baso # (Auto)     (0.0-0.1)  K/uL


 


Nucleated RBC %     /100WBC


 


Nucleated RBCs #     K/uL


 


Sodium     (136-148)  mmol/L


 


Potassium     (3.5-5.1)  mmol/L


 


Chloride     ()  mmol/L


 


Carbon Dioxide     (21.0-32.0)  mmol/L


 


BUN     (7.0-18.0)  mg/dL


 


Creatinine     (0.8-1.3)  mg/dL


 


Est Cr Clr Drug Dosing     mL/min


 


Estimated GFR (MDRD)     ml/min


 


Glucose     ()  mg/dL


 


POC Glucose   157 H  239 H  ()  mg/dL


 


Calcium     (8.5-10.1)  mg/dL


 


Total Bilirubin     (0.2-1.0)  mg/dL


 


AST     (15-37)  IU/L


 


ALT     (14-63)  IU/L


 


Alkaline Phosphatase     ()  U/L


 


Total Protein     (6.4-8.2)  g/dL


 


Albumin     (3.4-5.0)  g/dL


 


Globulin     (2.0-3.5)  g/dL


 


Albumin/Globulin Ratio     (1.3-2.8)  


 


Vancomycin Trough     (5.0-10.0)  ug/mL


 


Hepatitis A IgM Ab  Negative    (Negative)  


 


Hep Bs Antigen  Negative    (Negative)  


 


Hep B Core IgM Ab  Negative    (Negative)  


 


Hepatitis C Antibody  <0.1    (0.0-0.9)  s/co ratio














  07/06/18 07/07/18 07/07/18 Range/Units





  16:22 06:18 06:18 


 


WBC    12.69 H  (4.0-11.0)  K/uL


 


RBC    4.07 L  (4.50-5.90)  M/uL


 


Hgb    11.8 L  (13.0-17.0)  g/dL


 


Hct    34.8 L  (38.0-50.0)  %


 


MCV    85.5  (80.0-98.0)  fL


 


MCH    29.0  (27.0-32.0)  pg


 


MCHC    33.9  (31.0-37.0)  g/dL


 


RDW Std Deviation    47.1  (28.0-62.0)  fl


 


RDW Coeff of Bozena    15  (11.0-15.0)  %


 


Plt Count    230  (150-400)  K/uL


 


MPV    11.80  (7.40-12.00)  fL


 


Neut % (Auto)    76.5  (48.0-80.0)  %


 


Lymph % (Auto)    14.7 L  (16.0-40.0)  %


 


Mono % (Auto)    6.7  (0.0-15.0)  %


 


Eos % (Auto)    1.9  (0.0-7.0)  %


 


Baso % (Auto)    0.2  (0.0-1.5)  %


 


Neut # (Auto)    9.7 H  (1.4-5.7)  K/uL


 


Lymph # (Auto)    1.9  (0.6-2.4)  K/uL


 


Mono # (Auto)    0.9 H  (0.0-0.8)  K/uL


 


Eos # (Auto)    0.2  (0.0-0.7)  K/uL


 


Baso # (Auto)    0.0  (0.0-0.1)  K/uL


 


Nucleated RBC %    0.0  /100WBC


 


Nucleated RBCs #    0  K/uL


 


Sodium     (136-148)  mmol/L


 


Potassium     (3.5-5.1)  mmol/L


 


Chloride     ()  mmol/L


 


Carbon Dioxide     (21.0-32.0)  mmol/L


 


BUN     (7.0-18.0)  mg/dL


 


Creatinine     (0.8-1.3)  mg/dL


 


Est Cr Clr Drug Dosing     mL/min


 


Estimated GFR (MDRD)     ml/min


 


Glucose     ()  mg/dL


 


POC Glucose  255 H    ()  mg/dL


 


Calcium     (8.5-10.1)  mg/dL


 


Total Bilirubin     (0.2-1.0)  mg/dL


 


AST     (15-37)  IU/L


 


ALT     (14-63)  IU/L


 


Alkaline Phosphatase     ()  U/L


 


Total Protein     (6.4-8.2)  g/dL


 


Albumin     (3.4-5.0)  g/dL


 


Globulin     (2.0-3.5)  g/dL


 


Albumin/Globulin Ratio     (1.3-2.8)  


 


Vancomycin Trough   14.0 H   (5.0-10.0)  ug/mL


 


Hepatitis A IgM Ab     (Negative)  


 


Hep Bs Antigen     (Negative)  


 


Hep B Core IgM Ab     (Negative)  


 


Hepatitis C Antibody     (0.0-0.9)  s/co ratio














  07/07/18 07/07/18 07/07/18 Range/Units





  06:18 06:37 11:40 


 


WBC     (4.0-11.0)  K/uL


 


RBC     (4.50-5.90)  M/uL


 


Hgb     (13.0-17.0)  g/dL


 


Hct     (38.0-50.0)  %


 


MCV     (80.0-98.0)  fL


 


MCH     (27.0-32.0)  pg


 


MCHC     (31.0-37.0)  g/dL


 


RDW Std Deviation     (28.0-62.0)  fl


 


RDW Coeff of Bozena     (11.0-15.0)  %


 


Plt Count     (150-400)  K/uL


 


MPV     (7.40-12.00)  fL


 


Neut % (Auto)     (48.0-80.0)  %


 


Lymph % (Auto)     (16.0-40.0)  %


 


Mono % (Auto)     (0.0-15.0)  %


 


Eos % (Auto)     (0.0-7.0)  %


 


Baso % (Auto)     (0.0-1.5)  %


 


Neut # (Auto)     (1.4-5.7)  K/uL


 


Lymph # (Auto)     (0.6-2.4)  K/uL


 


Mono # (Auto)     (0.0-0.8)  K/uL


 


Eos # (Auto)     (0.0-0.7)  K/uL


 


Baso # (Auto)     (0.0-0.1)  K/uL


 


Nucleated RBC %     /100WBC


 


Nucleated RBCs #     K/uL


 


Sodium  140    (136-148)  mmol/L


 


Potassium  3.9    (3.5-5.1)  mmol/L


 


Chloride  104    ()  mmol/L


 


Carbon Dioxide  28.9    (21.0-32.0)  mmol/L


 


BUN  12    (7.0-18.0)  mg/dL


 


Creatinine  1.2    (0.8-1.3)  mg/dL


 


Est Cr Clr Drug Dosing  65.03    mL/min


 


Estimated GFR (MDRD)  > 60.0    ml/min


 


Glucose  219 H    ()  mg/dL


 


POC Glucose   202 H  297 H  ()  mg/dL


 


Calcium  8.0 L    (8.5-10.1)  mg/dL


 


Total Bilirubin  1.1 H    (0.2-1.0)  mg/dL


 


AST  47 H    (15-37)  IU/L


 


ALT  122 H    (14-63)  IU/L


 


Alkaline Phosphatase  146 H    ()  U/L


 


Total Protein  6.5    (6.4-8.2)  g/dL


 


Albumin  1.8 L    (3.4-5.0)  g/dL


 


Globulin  4.7 H    (2.0-3.5)  g/dL


 


Albumin/Globulin Ratio  0.4 L    (1.3-2.8)  


 


Vancomycin Trough     (5.0-10.0)  ug/mL


 


Hepatitis A IgM Ab     (Negative)  


 


Hep Bs Antigen     (Negative)  


 


Hep B Core IgM Ab     (Negative)  


 


Hepatitis C Antibody     (0.0-0.9)  s/co ratio











Nacho Results Last 24 Hours: 


 Microbiology











 07/03/18 12:53 Aerobic Blood Culture - Preliminary





 Blood - Venous - Lab Draw    NO GROWTH AFTER 3 DAYS





 Anaerobic Blood Culture - Preliminary





    NO GROWTH AFTER 3 DAYS


 


 07/03/18 11:35 Aerobic Blood Culture - Preliminary





 Blood - Venous    NO GROWTH AFTER 3 DAYS





 Anaerobic Blood Culture - Preliminary





    NO GROWTH AFTER 3 DAYS











Med Orders - Current: 


 Current Medications





Acetaminophen (Tylenol)  650 mg PO Q4H PRN


   PRN Reason: Pain (mild 1-3)


Amlodipine Besylate (Norvasc)  10 mg PO DAILY [facility]


   Last Admin: 07/07/18 08:28 Dose:  10 mg


Aspirin (Aspirin)  325 mg PO BID [facility]


   Last Admin: 07/07/18 08:27 Dose:  325 mg


Benzonatate (Tessalon Perles)  100 mg PO TID PRN


   PRN Reason: Cough


   Last Admin: 07/06/18 20:31 Dose:  100 mg


Heparin Sodium (Porcine) (Heparin Sodium)  5,000 units SUBCUT Q12H [facility]


   Last Admin: 07/07/18 02:20 Dose:  5,000 units


Vancomycin HCl 2 gm/ Sodium (Chloride)  500 mls @ 333.333 mls/hr IV Q12H [facility]


   Last Admin: 07/07/18 07:55 Dose:  333.333 mls/hr


Piperacillin Sod/Tazobactam (Sod 4.5 gm/ Sodium Chloride)  100 mls @ 100 mls/hr 

IV Q6H [facility]


   Last Admin: 07/07/18 10:05 Dose:  100 mls/hr


Insulin Aspart (Novolog)  0 unit SUBCUT TIDAC [facility]; Protocol


   Last Admin: 07/07/18 07:35 Dose:  4 units


Lisinopril (Prinivil)  40 mg PO DAILY [facility]


   Last Admin: 07/07/18 08:28 Dose:  40 mg


Loratadine (Claritin)  10 mg PO DAILY [facility]


   Last Admin: 07/07/18 08:27 Dose:  10 mg


Omeprazole (Omeprazole)  20 mg PO ACBREAKFAST [facility]


   Last Admin: 07/07/18 06:41 Dose:  20 mg


Ondansetron HCl (Zofran)  4 mg IVPUSH Q4H PRN


   PRN Reason: Nausea


Sodium Chloride (Saline Flush)  10 ml FLUSH ASDIRECTED PRN


   PRN Reason: Keep Vein Open


Sodium Chloride (Saline Flush)  2.5 ml FLUSH ASDIRECTED PRN


   PRN Reason: Keep Vein Open


Sodium Chloride (Ocean Nasal Spray)  0 ml AMAN DAILY [facility]


   Last Admin: 07/07/18 10:04 Dose:  Not Given


Valsartan (Diovan)  320 mg PO DAILY [facility]


   Last Admin: 07/07/18 08:28 Dose:  320 mg


Vancomycin HCl (Pharmacy To Dose - Vancomycin)  1 dose .XX ASDIRECTED [facility]





Discontinued Medications





Vancomycin HCl 1 gm/ Sodium (Chloride)  250 mls @ 250 mls/hr IV ONETIME ONE


   Stop: 07/03/18 13:42


   Last Admin: 07/03/18 12:53 Dose:  250 mls/hr


Sodium Chloride (Normal Saline)  1,000 mls @ 125 mls/hr IV ASDIRECTED [facility]


   Last Admin: 07/04/18 03:29 Dose:  125 mls/hr


Vancomycin HCl 1,500 mg/ (Sodium Chloride)  500 mls @ 250 mls/hr IV Q12H [facility]


   Last Admin: 07/05/18 06:21 Dose:  Not Given


Potassium Chloride (Klor-Con M20)  40 meq PO ONETIME ONE


   Stop: 07/04/18 07:39


   Last Admin: 07/04/18 08:10 Dose:  40 meq


Potassium Chloride (Klor-Con M20)  40 meq PO ONETIME ONE


   Stop: 07/05/18 07:20


   Last Admin: 07/05/18 08:12 Dose:  40 meq


Pravastatin Sodium (Pravachol)  40 mg PO BEDTIME [facility]


   Last Admin: 07/04/18 20:26 Dose:  40 mg











- Exam


General: Alert, Oriented


HEENT: Pupils Equal, Pupils Reactive


Neck: Supple


Lungs: Clear to Auscultation, Normal Respiratory Effort


Cardiovascular: Regular Rate, Regular Rhythm


GI/Abdominal Exam: Soft, Non-Tender


Extremities: Other (erythema has decreased on upper thigh, edema has improved 

on lower leg)





- Problem List Review


Problem List Initiated/Reviewed/Updated: Yes





- Plan


Plan:: 





This 57 year old male admitted with R ower leg cellulitis





1. R lower leg cellulitis:  Leukocytosis decreased to 12,690. will continue 

Vancomycin and zosyn





2. HTN: BPS elevated to 160-170s, will monitor. All home medications restarted. 

Patient reports BP are normally near this. 





3. Dm type 2: A1c 6.8.  BS control good. Hold oral medications. Novolog SSI and 

monitor BS TIDAC. 





4. Elevated LFTs: Denies known history of this. Hepatitis panel pending. Abd U/

S reveals mild fatty infiltration, poor evaluation obtained due to body 

habitus. Will hold Statin.





VTE prophylaxis: Heparin





Dispo: 2-3 days pending improvement.

## 2018-07-08 LAB
CHLORIDE SERPL-SCNC: 106 MMOL/L (ref 98–107)
SODIUM SERPL-SCNC: 142 MMOL/L (ref 136–148)

## 2018-07-08 RX ADMIN — DEXTROSE SCH UNITS: 10 SOLUTION INTRAVENOUS at 14:43

## 2018-07-08 RX ADMIN — DEXTROSE SCH UNITS: 10 SOLUTION INTRAVENOUS at 02:28

## 2018-07-08 RX ADMIN — OMEPRAZOLE SCH MG: 20 CAPSULE, DELAYED RELEASE ORAL at 06:42

## 2018-07-08 NOTE — PCM.PN
- General Info


Date of Service: 07/08/18





- Review of Systems


Systems Review Comment:: 





erythema and swelling improving.





- Patient Data


Vitals - Most Recent: 


 Last Vital Signs











Temp  37.3 C   07/08/18 04:00


 


Pulse  98   07/08/18 04:00


 


Resp  18   07/08/18 04:00


 


BP  177/97 H  07/08/18 08:14


 


Pulse Ox  96   07/08/18 04:00











Weight - Most Recent: 153.6 kg


I&O - Last 24 Hours: 


 Intake & Output











 07/07/18 07/08/18 07/08/18





 22:59 06:59 14:59


 


Intake Total 100 400 100


 


Output Total  1660 


 


Balance 100 -1260 100











Lab Results Last 24 Hours: 


 Laboratory Results - last 24 hr











  07/07/18 07/07/18 07/08/18 Range/Units





  11:40 16:20 05:39 


 


WBC    11.51 H  (4.0-11.0)  K/uL


 


RBC    4.13 L  (4.50-5.90)  M/uL


 


Hgb    12.0 L  (13.0-17.0)  g/dL


 


Hct    35.7 L  (38.0-50.0)  %


 


MCV    86.4  (80.0-98.0)  fL


 


MCH    29.1  (27.0-32.0)  pg


 


MCHC    33.6  (31.0-37.0)  g/dL


 


RDW Std Deviation    49.3  (28.0-62.0)  fl


 


RDW Coeff of Bozena    16 H  (11.0-15.0)  %


 


Plt Count    199  (150-400)  K/uL


 


MPV    11.00  (7.40-12.00)  fL


 


Neut % (Auto)    76.3  (48.0-80.0)  %


 


Lymph % (Auto)    13.5 L  (16.0-40.0)  %


 


Mono % (Auto)    7.5  (0.0-15.0)  %


 


Eos % (Auto)    2.3  (0.0-7.0)  %


 


Baso % (Auto)    0.4  (0.0-1.5)  %


 


Neut # (Auto)    8.8 H  (1.4-5.7)  K/uL


 


Lymph # (Auto)    1.6  (0.6-2.4)  K/uL


 


Mono # (Auto)    0.9 H  (0.0-0.8)  K/uL


 


Eos # (Auto)    0.3  (0.0-0.7)  K/uL


 


Baso # (Auto)    0.1  (0.0-0.1)  K/uL


 


Nucleated RBC %    0.0  /100WBC


 


Nucleated RBCs #    0  K/uL


 


Sodium     (136-148)  mmol/L


 


Potassium     (3.5-5.1)  mmol/L


 


Chloride     ()  mmol/L


 


Carbon Dioxide     (21.0-32.0)  mmol/L


 


BUN     (7.0-18.0)  mg/dL


 


Creatinine     (0.8-1.3)  mg/dL


 


Est Cr Clr Drug Dosing     mL/min


 


Estimated GFR (MDRD)     ml/min


 


Glucose     ()  mg/dL


 


POC Glucose  297 H  238 H   ()  mg/dL


 


Calcium     (8.5-10.1)  mg/dL


 


Total Bilirubin     (0.2-1.0)  mg/dL


 


AST     (15-37)  IU/L


 


ALT     (14-63)  IU/L


 


Alkaline Phosphatase     ()  U/L


 


Total Protein     (6.4-8.2)  g/dL


 


Albumin     (3.4-5.0)  g/dL


 


Globulin     (2.0-3.5)  g/dL


 


Albumin/Globulin Ratio     (1.3-2.8)  














  07/08/18 07/08/18 Range/Units





  05:39 06:35 


 


WBC    (4.0-11.0)  K/uL


 


RBC    (4.50-5.90)  M/uL


 


Hgb    (13.0-17.0)  g/dL


 


Hct    (38.0-50.0)  %


 


MCV    (80.0-98.0)  fL


 


MCH    (27.0-32.0)  pg


 


MCHC    (31.0-37.0)  g/dL


 


RDW Std Deviation    (28.0-62.0)  fl


 


RDW Coeff of Bozena    (11.0-15.0)  %


 


Plt Count    (150-400)  K/uL


 


MPV    (7.40-12.00)  fL


 


Neut % (Auto)    (48.0-80.0)  %


 


Lymph % (Auto)    (16.0-40.0)  %


 


Mono % (Auto)    (0.0-15.0)  %


 


Eos % (Auto)    (0.0-7.0)  %


 


Baso % (Auto)    (0.0-1.5)  %


 


Neut # (Auto)    (1.4-5.7)  K/uL


 


Lymph # (Auto)    (0.6-2.4)  K/uL


 


Mono # (Auto)    (0.0-0.8)  K/uL


 


Eos # (Auto)    (0.0-0.7)  K/uL


 


Baso # (Auto)    (0.0-0.1)  K/uL


 


Nucleated RBC %    /100WBC


 


Nucleated RBCs #    K/uL


 


Sodium  142   (136-148)  mmol/L


 


Potassium  4.1   (3.5-5.1)  mmol/L


 


Chloride  106   ()  mmol/L


 


Carbon Dioxide  31.3   (21.0-32.0)  mmol/L


 


BUN  9   (7.0-18.0)  mg/dL


 


Creatinine  1.3   (0.8-1.3)  mg/dL


 


Est Cr Clr Drug Dosing  60.03   mL/min


 


Estimated GFR (MDRD)  57.3   ml/min


 


Glucose  212 H   ()  mg/dL


 


POC Glucose   202 H  ()  mg/dL


 


Calcium  8.0 L   (8.5-10.1)  mg/dL


 


Total Bilirubin  0.9   (0.2-1.0)  mg/dL


 


AST  23   (15-37)  IU/L


 


ALT  85 H   (14-63)  IU/L


 


Alkaline Phosphatase  125 H   ()  U/L


 


Total Protein  6.6   (6.4-8.2)  g/dL


 


Albumin  1.8 L   (3.4-5.0)  g/dL


 


Globulin  4.8 H   (2.0-3.5)  g/dL


 


Albumin/Globulin Ratio  0.4 L   (1.3-2.8)  











Nacho Results Last 24 Hours: 


 Microbiology











 07/03/18 12:53 Aerobic Blood Culture - Preliminary





 Blood - Venous - Lab Draw    NO GROWTH AFTER 4 DAYS





 Anaerobic Blood Culture - Preliminary





    NO GROWTH AFTER 4 DAYS


 


 07/03/18 11:35 Aerobic Blood Culture - Preliminary





 Blood - Venous    NO GROWTH AFTER 4 DAYS





 Anaerobic Blood Culture - Preliminary





    NO GROWTH AFTER 4 DAYS











Med Orders - Current: 


 Current Medications





Acetaminophen (Tylenol)  650 mg PO Q4H PRN


   PRN Reason: Pain (mild 1-3)


   Last Admin: 07/08/18 06:44 Dose:  650 mg


Amlodipine Besylate (Norvasc)  10 mg PO DAILY [facility]


   Last Admin: 07/08/18 08:14 Dose:  10 mg


Aspirin (Aspirin)  325 mg PO BID [facility]


   Last Admin: 07/08/18 08:13 Dose:  325 mg


Benzonatate (Tessalon Perles)  100 mg PO TID PRN


   PRN Reason: Cough


   Last Admin: 07/06/18 20:31 Dose:  100 mg


Heparin Sodium (Porcine) (Heparin Sodium)  5,000 units SUBCUT Q12H [facility]


   Last Admin: 07/08/18 02:28 Dose:  5,000 units


Piperacillin Sod/Tazobactam (Sod 4.5 gm/ Sodium Chloride)  100 mls @ 100 mls/hr 

IV Q6H [facility]


   Last Admin: 07/08/18 08:06 Dose:  100 mls/hr


Vancomycin HCl 1.5 gm/ Sodium (Chloride)  500 mls @ 333.333 mls/hr IV Q12H [facility]


Insulin Aspart (Novolog)  0 unit SUBCUT TIDAC [facility]; Protocol


   Last Admin: 07/08/18 08:04 Dose:  4 units


Lisinopril (Prinivil)  40 mg PO DAILY [facility]


   Last Admin: 07/08/18 08:14 Dose:  40 mg


Loratadine (Claritin)  10 mg PO DAILY [facility]


   Last Admin: 07/08/18 08:13 Dose:  10 mg


Omeprazole (Omeprazole)  20 mg PO ACBREAKFAST [facility]


   Last Admin: 07/08/18 06:42 Dose:  20 mg


Ondansetron HCl (Zofran)  4 mg IVPUSH Q4H PRN


   PRN Reason: Nausea


Sodium Chloride (Saline Flush)  10 ml FLUSH ASDIRECTED PRN


   PRN Reason: Keep Vein Open


Sodium Chloride (Saline Flush)  2.5 ml FLUSH ASDIRECTED PRN


   PRN Reason: Keep Vein Open


Sodium Chloride (Ocean Nasal Spray)  0 ml AMAN DAILY [facility]


   Last Admin: 07/08/18 08:19 Dose:  Not Given


Valsartan (Diovan)  320 mg PO DAILY [facility]


   Last Admin: 07/08/18 08:14 Dose:  320 mg


Vancomycin HCl (Pharmacy To Dose - Vancomycin)  1 dose .XX ASDIRECTED [facility]





Discontinued Medications





Vancomycin HCl 1 gm/ Sodium (Chloride)  250 mls @ 250 mls/hr IV ONETIME ONE


   Stop: 07/03/18 13:42


   Last Admin: 07/03/18 12:53 Dose:  250 mls/hr


Sodium Chloride (Normal Saline)  1,000 mls @ 125 mls/hr IV ASDIRECTED [facility]


   Last Admin: 07/04/18 03:29 Dose:  125 mls/hr


Vancomycin HCl 1,500 mg/ (Sodium Chloride)  500 mls @ 250 mls/hr IV Q12H [facility]


   Last Admin: 07/05/18 06:21 Dose:  Not Given


Vancomycin HCl 2 gm/ Sodium (Chloride)  500 mls @ 333.333 mls/hr IV Q12H [facility]


   Last Admin: 07/08/18 06:06 Dose:  333.333 mls/hr


Vancomycin HCl 1.5 gm/ Sodium (Chloride)  500 mls @ 333.333 mls/hr IV Q12H [facility]


Potassium Chloride (Klor-Con M20)  40 meq PO ONETIME ONE


   Stop: 07/04/18 07:39


   Last Admin: 07/04/18 08:10 Dose:  40 meq


Potassium Chloride (Klor-Con M20)  40 meq PO ONETIME ONE


   Stop: 07/05/18 07:20


   Last Admin: 07/05/18 08:12 Dose:  40 meq


Pravastatin Sodium (Pravachol)  40 mg PO BEDTIME [facility]


   Last Admin: 07/04/18 20:26 Dose:  40 mg











- Exam


General: Alert, Oriented


Neck: Supple


Lungs: Clear to Auscultation, Normal Respiratory Effort


Cardiovascular: Regular Rate, Regular Rhythm


GI/Abdominal Exam: Soft, Non-Tender, No Organomegaly


Extremities: Other (rash and edema of right leg improving, no purulent drainage)





- Problem List Review


Problem List Initiated/Reviewed/Updated: Yes





- My Orders


Last 24 Hours: 


My Active Orders





07/09/18 05:11


BASIC METABOLIC PANEL,BMP [CHEM] AM 


CBC WITH AUTO DIFF [HEME] AM 





07/09/18 08:00


Vancomycin 1.5 gm   Sodium Chloride 0.9% [Normal Saline] 500 ml IV Q12H 














- Plan


Plan:: 





This 57 year old male admitted with R ower leg cellulitis





1. R lower leg cellulitis:   We will continue Vancomycin and Zosyn.  Likely 

discharge home tomorrow on oral antibiotics if he continues to improve.

## 2018-07-09 LAB
CHLORIDE SERPL-SCNC: 106 MMOL/L (ref 98–107)
SODIUM SERPL-SCNC: 142 MMOL/L (ref 136–148)

## 2018-07-09 RX ADMIN — OMEPRAZOLE SCH MG: 20 CAPSULE, DELAYED RELEASE ORAL at 07:12

## 2018-07-09 RX ADMIN — DEXTROSE SCH UNITS: 10 SOLUTION INTRAVENOUS at 01:47

## 2018-07-09 NOTE — [id number]
**Discharge Summary





- Discharge Data


Discharge Date: 07/09/18


Discharge Disposition: Home, Self-Care 01


Condition: Fair





- Patient Summary/Data


Hospital Course: 


This 57 year old male with pmh of DM type 2, HTN, and obesity who was admitted 

for right lower extremity cellulitis.  Hepresented to the ED with complaints of 

a red, warm swollen, tender R lower leg that has been onging on for nearly 1 

week.  In the ED leukocytosis noted at 13,990, Na 135, BUN 20, Cr 1.3. Glucose 

406. UA negative. Bili 1.6 AST 61, ALT 97, Alk phos 159. He initially was noted 

to be slightly tachycardic, BP WNL and no hypoxia. Venous doppler obtained of R 

lower leg, which was negative for DVT and CXR negative. Liver ultrasound showed 

fatty infiltrate of liver.  He was treated with IV antibiotics of vancomycin 

and zosyn.  He did have improvement of his erythema and edema and today he is 

requesting discharge.  He is to be discharged on clindamycin for 5 more days.





- Patient Instructions


Diet: Regular Diet as Tolerated





- Discharge Plan


Prescriptions/Med Rec: 


Clindamycin HCl 300 mg PO Q6H #20 capsule


Home Medications: 


 Home Meds





Aspirin 325 mg PO BID 07/03/18 [History]


Glimepiride [Amaryl] 4 mg PO DAILY 07/03/18 [History]


Lisinopril 40 mg PO DAILY 07/03/18 [History]


Naproxen Sodium [Aleve] 220 mg PO ASDIRECTED PRN 07/03/18 [History]


Omeprazole 20 mg PO DAILY 07/03/18 [History]


Pravastatin [Pravachol] 40 mg PO DAILY 07/03/18 [History]


amLODIPine/Valsartan [Amlodipine-Valsartan  mg] 1 each PO DAILY 07/03/18 [

History]


metFORMIN HCl [Metformin HCl] 1,000 mg PO BID 07/03/18 [History]


Clindamycin HCl 300 mg PO Q6H #20 capsule 07/09/18 [Rx]








Patient Handouts:  Cellulitis, Adult, Easy-to-Read, Hypertension, Easy-to-Read


Referrals: 


PCP,None [Primary Care Provider] - 





- Patient Data


Vitals - Most Recent: 


 Last Vital Signs











Temp  36.4 C   07/09/18 04:00


 


Pulse  87   07/09/18 04:00


 


Resp  18   07/09/18 04:00


 


BP  172/89 H  07/09/18 04:00


 


Pulse Ox  96   07/09/18 04:00











Weight - Most Recent: 153.6 kg


I&O - Last 24 hours: 


 Intake & Output











 07/08/18 07/09/18 07/09/18





 22:59 06:59 14:59


 


Intake Total 858 700 


 


Output Total 2630 1420 


 


Balance -1772 -2230 











Lab Results - Last 24 hrs: 


 Laboratory Results - last 24 hr











  07/08/18 07/08/18 07/09/18 Range/Units





  12:19 16:24 05:41 


 


WBC    10.31  (4.0-11.0)  K/uL


 


RBC    4.10 L  (4.50-5.90)  M/uL


 


Hgb    11.9 L  (13.0-17.0)  g/dL


 


Hct    35.8 L  (38.0-50.0)  %


 


MCV    87.3  (80.0-98.0)  fL


 


MCH    29.0  (27.0-32.0)  pg


 


MCHC    33.2  (31.0-37.0)  g/dL


 


RDW Std Deviation    50.1  (28.0-62.0)  fl


 


RDW Coeff of Bozena    16 H  (11.0-15.0)  %


 


Plt Count    201  (150-400)  K/uL


 


MPV    11.30  (7.40-12.00)  fL


 


Neut % (Auto)    76.7  (48.0-80.0)  %


 


Lymph % (Auto)    13.2 L  (16.0-40.0)  %


 


Mono % (Auto)    8.0  (0.0-15.0)  %


 


Eos % (Auto)    1.8  (0.0-7.0)  %


 


Baso % (Auto)    0.3  (0.0-1.5)  %


 


Neut # (Auto)    7.9 H  (1.4-5.7)  K/uL


 


Lymph # (Auto)    1.4  (0.6-2.4)  K/uL


 


Mono # (Auto)    0.8  (0.0-0.8)  K/uL


 


Eos # (Auto)    0.2  (0.0-0.7)  K/uL


 


Baso # (Auto)    0.0  (0.0-0.1)  K/uL


 


Nucleated RBC %    0.0  /100WBC


 


Nucleated RBCs #    0  K/uL


 


Sodium     (136-148)  mmol/L


 


Potassium     (3.5-5.1)  mmol/L


 


Chloride     ()  mmol/L


 


Carbon Dioxide     (21.0-32.0)  mmol/L


 


BUN     (7.0-18.0)  mg/dL


 


Creatinine     (0.8-1.3)  mg/dL


 


Est Cr Clr Drug Dosing     mL/min


 


Estimated GFR (MDRD)     ml/min


 


Glucose     ()  mg/dL


 


POC Glucose  261 H  296 H   ()  mg/dL


 


Calcium     (8.5-10.1)  mg/dL














  07/09/18 Range/Units





  05:41 


 


WBC   (4.0-11.0)  K/uL


 


RBC   (4.50-5.90)  M/uL


 


Hgb   (13.0-17.0)  g/dL


 


Hct   (38.0-50.0)  %


 


MCV   (80.0-98.0)  fL


 


MCH   (27.0-32.0)  pg


 


MCHC   (31.0-37.0)  g/dL


 


RDW Std Deviation   (28.0-62.0)  fl


 


RDW Coeff of Bozena   (11.0-15.0)  %


 


Plt Count   (150-400)  K/uL


 


MPV   (7.40-12.00)  fL


 


Neut % (Auto)   (48.0-80.0)  %


 


Lymph % (Auto)   (16.0-40.0)  %


 


Mono % (Auto)   (0.0-15.0)  %


 


Eos % (Auto)   (0.0-7.0)  %


 


Baso % (Auto)   (0.0-1.5)  %


 


Neut # (Auto)   (1.4-5.7)  K/uL


 


Lymph # (Auto)   (0.6-2.4)  K/uL


 


Mono # (Auto)   (0.0-0.8)  K/uL


 


Eos # (Auto)   (0.0-0.7)  K/uL


 


Baso # (Auto)   (0.0-0.1)  K/uL


 


Nucleated RBC %   /100WBC


 


Nucleated RBCs #   K/uL


 


Sodium  142  (136-148)  mmol/L


 


Potassium  4.0  (3.5-5.1)  mmol/L


 


Chloride  106  ()  mmol/L


 


Carbon Dioxide  31.2  (21.0-32.0)  mmol/L


 


BUN  8  (7.0-18.0)  mg/dL


 


Creatinine  1.5 H  (0.8-1.3)  mg/dL


 


Est Cr Clr Drug Dosing  52.02  mL/min


 


Estimated GFR (MDRD)  48.6  ml/min


 


Glucose  220 H  ()  mg/dL


 


POC Glucose   ()  mg/dL


 


Calcium  8.1 L  (8.5-10.1)  mg/dL











[facility] Results - Last 24 hrs: 


 Microbiology











 07/03/18 12:53 Aerobic Blood Culture - Final





 Blood - Venous - Lab Draw    NO GROWTH AFTER 5 DAYS





 Anaerobic Blood Culture - Final





    NO GROWTH AFTER 5 DAYS


 


 07/03/18 11:35 Aerobic Blood Culture - Final





 Blood - Venous    NO GROWTH AFTER 5 DAYS





 Anaerobic Blood Culture - Final





    NO GROWTH AFTER 5 DAYS











Med Orders - Current: 


 Current Medications





Acetaminophen (Tylenol)  650 mg PO Q4H PRN


   PRN Reason: Pain (mild 1-3)


   Last Admin: 07/08/18 20:40 Dose:  650 mg


Amlodipine Besylate (Norvasc)  10 mg PO DAILY [facility]


   Last Admin: 07/08/18 08:14 Dose:  10 mg


Aspirin (Aspirin)  325 mg PO BID [facility]


   Last Admin: 07/08/18 20:40 Dose:  325 mg


Benzonatate (Tessalon Perles)  100 mg PO TID PRN


   PRN Reason: Cough


   Last Admin: 07/08/18 20:42 Dose:  100 mg


Heparin Sodium (Porcine) (Heparin Sodium)  5,000 units SUBCUT Q12H [facility]


   Last Admin: 07/09/18 01:47 Dose:  5,000 units


Piperacillin Sod/Tazobactam (Sod 4.5 gm/ Sodium Chloride)  100 mls @ 100 mls/hr 

IV Q6H [facility]


   Last Admin: 07/09/18 01:45 Dose:  100 mls/hr


Vancomycin HCl 1.5 gm/ Sodium (Chloride)  500 mls @ 333.333 mls/hr IV Q12H [facility]


Insulin Aspart (Novolog)  0 unit SUBCUT TIDAC [facility]; Protocol


   Last Admin: 07/08/18 16:26 Dose:  6 units


Lisinopril (Prinivil)  40 mg PO DAILY [facility]


   Last Admin: 07/08/18 08:14 Dose:  40 mg


Loratadine (Claritin)  10 mg PO DAILY [facility]


   Last Admin: 07/08/18 08:13 Dose:  10 mg


Omeprazole (Omeprazole)  20 mg PO ACBREAKFAST [facility]


   Last Admin: 07/09/18 07:12 Dose:  20 mg


Ondansetron HCl (Zofran)  4 mg IVPUSH Q4H PRN


   PRN Reason: Nausea


Sodium Chloride (Saline Flush)  10 ml FLUSH ASDIRECTED PRN


   PRN Reason: Keep Vein Open


Sodium Chloride (Saline Flush)  2.5 ml FLUSH ASDIRECTED PRN


   PRN Reason: Keep Vein Open


Sodium Chloride (Ocean Nasal Spray)  0 ml AMAN DAILY [facility]


   Last Admin: 07/08/18 08:19 Dose:  Not Given


Valsartan (Diovan)  320 mg PO DAILY [facility]


   Last Admin: 07/08/18 08:14 Dose:  320 mg


Vancomycin HCl (Pharmacy To Dose - Vancomycin)  1 dose .XX ASDIRECTED [facility]





Discontinued Medications





Vancomycin HCl 1 gm/ Sodium (Chloride)  250 mls @ 250 mls/hr IV ONETIME ONE


   Stop: 07/03/18 13:42


   Last Admin: 07/03/18 12:53 Dose:  250 mls/hr


Sodium Chloride (Normal Saline)  1,000 mls @ 125 mls/hr IV ASDIRECTED [facility]


   Last Admin: 07/04/18 03:29 Dose:  125 mls/hr


Vancomycin HCl 1,500 mg/ (Sodium Chloride)  500 mls @ 250 mls/hr IV Q12H [facility]


   Last Admin: 07/05/18 06:21 Dose:  Not Given


Vancomycin HCl 2 gm/ Sodium (Chloride)  500 mls @ 333.333 mls/hr IV Q12H [facility]


   Last Admin: 07/08/18 06:06 Dose:  333.333 mls/hr


Vancomycin HCl 1.5 gm/ Sodium (Chloride)  500 mls @ 333.333 mls/hr IV Q12H [facility]


   Last Admin: 07/08/18 19:40 Dose:  Not Given


Potassium Chloride (Klor-Con M20)  40 meq PO ONETIME ONE


   Stop: 07/04/18 07:39


   Last Admin: 07/04/18 08:10 Dose:  40 meq


Potassium Chloride (Klor-Con M20)  40 meq PO ONETIME ONE


   Stop: 07/05/18 07:20


   Last Admin: 07/05/18 08:12 Dose:  40 meq


Pravastatin Sodium (Pravachol)  40 mg PO BEDTIME [facility]


   Last Admin: 07/04/18 20:26 Dose:  40 mg